# Patient Record
Sex: FEMALE | URBAN - NONMETROPOLITAN AREA
[De-identification: names, ages, dates, MRNs, and addresses within clinical notes are randomized per-mention and may not be internally consistent; named-entity substitution may affect disease eponyms.]

---

## 2018-10-31 ENCOUNTER — APPOINTMENT (RX ONLY)
Dept: URBAN - NONMETROPOLITAN AREA CLINIC 12 | Facility: CLINIC | Age: 73
Setting detail: DERMATOLOGY
End: 2018-10-31

## 2018-10-31 DIAGNOSIS — L82.1 OTHER SEBORRHEIC KERATOSIS: ICD-10-CM

## 2018-10-31 DIAGNOSIS — L57.0 ACTINIC KERATOSIS: ICD-10-CM

## 2018-10-31 DIAGNOSIS — Z71.89 OTHER SPECIFIED COUNSELING: ICD-10-CM

## 2018-10-31 DIAGNOSIS — L57.8 OTHER SKIN CHANGES DUE TO CHRONIC EXPOSURE TO NONIONIZING RADIATION: ICD-10-CM

## 2018-10-31 DIAGNOSIS — D22 MELANOCYTIC NEVI: ICD-10-CM

## 2018-10-31 DIAGNOSIS — L82.0 INFLAMED SEBORRHEIC KERATOSIS: ICD-10-CM

## 2018-10-31 DIAGNOSIS — L81.4 OTHER MELANIN HYPERPIGMENTATION: ICD-10-CM

## 2018-10-31 DIAGNOSIS — D18.0 HEMANGIOMA: ICD-10-CM

## 2018-10-31 PROBLEM — I10 ESSENTIAL (PRIMARY) HYPERTENSION: Status: ACTIVE | Noted: 2018-10-31

## 2018-10-31 PROBLEM — D22.5 MELANOCYTIC NEVI OF TRUNK: Status: ACTIVE | Noted: 2018-10-31

## 2018-10-31 PROBLEM — D18.01 HEMANGIOMA OF SKIN AND SUBCUTANEOUS TISSUE: Status: ACTIVE | Noted: 2018-10-31

## 2018-10-31 PROCEDURE — ? COUNSELING

## 2018-10-31 PROCEDURE — 17003 DESTRUCT PREMALG LES 2-14: CPT

## 2018-10-31 PROCEDURE — ? MEDICATION COUNSELING

## 2018-10-31 PROCEDURE — ? LIQUID NITROGEN

## 2018-10-31 PROCEDURE — ? PRESCRIPTION

## 2018-10-31 PROCEDURE — 17000 DESTRUCT PREMALG LESION: CPT | Mod: 59

## 2018-10-31 PROCEDURE — 99202 OFFICE O/P NEW SF 15 MIN: CPT | Mod: 25

## 2018-10-31 PROCEDURE — 17110 DESTRUCTION B9 LES UP TO 14: CPT

## 2018-10-31 RX ORDER — HYDROQUINONE 40 MG/G
CREAM TOPICAL
Qty: 1 | Refills: 1 | Status: ERX | COMMUNITY
Start: 2018-10-31

## 2018-10-31 RX ADMIN — HYDROQUINONE: 40 CREAM TOPICAL at 18:05

## 2018-10-31 ASSESSMENT — LOCATION DETAILED DESCRIPTION DERM
LOCATION DETAILED: RIGHT DORSAL WRIST
LOCATION DETAILED: RIGHT LATERAL TRAPEZIAL NECK
LOCATION DETAILED: LEFT PROXIMAL DORSAL FOREARM
LOCATION DETAILED: LEFT DISTAL DORSAL FOREARM
LOCATION DETAILED: LEFT LATERAL FOREHEAD
LOCATION DETAILED: UPPER STERNUM
LOCATION DETAILED: LEFT CENTRAL FRONTAL SCALP
LOCATION DETAILED: RIGHT PROXIMAL DORSAL FOREARM
LOCATION DETAILED: LEFT LATERAL TRAPEZIAL NECK
LOCATION DETAILED: LEFT CENTRAL TEMPLE
LOCATION DETAILED: LEFT NASAL SIDEWALL
LOCATION DETAILED: RIGHT LATERAL FOREHEAD
LOCATION DETAILED: RIGHT MEDIAL FOREHEAD
LOCATION DETAILED: RIGHT INFERIOR FOREHEAD

## 2018-10-31 ASSESSMENT — LOCATION SIMPLE DESCRIPTION DERM
LOCATION SIMPLE: LEFT FOREHEAD
LOCATION SIMPLE: RIGHT FOREARM
LOCATION SIMPLE: POSTERIOR NECK
LOCATION SIMPLE: LEFT NOSE
LOCATION SIMPLE: SCALP
LOCATION SIMPLE: RIGHT FOREHEAD
LOCATION SIMPLE: LEFT TEMPLE
LOCATION SIMPLE: CHEST
LOCATION SIMPLE: LEFT FOREARM
LOCATION SIMPLE: RIGHT WRIST

## 2018-10-31 ASSESSMENT — LOCATION ZONE DERM
LOCATION ZONE: ARM
LOCATION ZONE: NOSE
LOCATION ZONE: TRUNK
LOCATION ZONE: NECK
LOCATION ZONE: FACE
LOCATION ZONE: SCALP

## 2018-10-31 NOTE — PROCEDURE: LIQUID NITROGEN
Consent: The patient's consent was obtained including but not limited to risks of crusting, scabbing, blistering, scarring, darker or lighter pigmentary change, recurrence, incomplete removal and infection.
Number Of Freeze-Thaw Cycles: 2 freeze-thaw cycles
Post-Care Instructions: I reviewed with the patient in detail post-care instructions. Patient is to wear sunprotection, and avoid picking at any of the treated lesions. Pt may apply Vaseline to crusted or scabbing areas.
Detail Level: Detailed
Duration Of Freeze Thaw-Cycle (Seconds): 1
Render Post-Care Instructions In Note?: yes
Add 52 Modifier (Optional): no
Medical Necessity Clause: This procedure was medically necessary because the lesions that were treated were:
Number Of Freeze-Thaw Cycles: 3 freeze-thaw cycles
Medical Necessity Information: It is in your best interest to select a reason for this procedure from the list below. All of these items fulfill various CMS LCD requirements except the new and changing color options.
Duration Of Freeze Thaw-Cycle (Seconds): 3

## 2018-10-31 NOTE — PROCEDURE: MEDICATION COUNSELING
Carac Pregnancy And Lactation Text: This medication is Pregnancy Category X and contraindicated in pregnancy and in women who may become pregnant. It is unknown if this medication is excreted in breast milk.
Minoxidil Pregnancy And Lactation Text: This medication has not been assigned a Pregnancy Risk Category but animal studies failed to show danger with the topical medication. It is unknown if the medication is excreted in breast milk.
Humira Pregnancy And Lactation Text: This medication is Pregnancy Category B and is considered safe during pregnancy. It is unknown if this medication is excreted in breast milk.
Albendazole Counseling:  I discussed with the patient the risks of albendazole including but not limited to cytopenia, kidney damage, nausea/vomiting and severe allergy.  The patient understands that this medication is being used in an off-label manner.
Colchicine Counseling:  Patient counseled regarding adverse effects including but not limited to stomach upset (nausea, vomiting, stomach pain, or diarrhea).  Patient instructed to limit alcohol consumption while taking this medication.  Colchicine may reduce blood counts especially with prolonged use.  The patient understands that monitoring of kidney function and blood counts may be required, especially at baseline. The patient verbalized understanding of the proper use and possible adverse effects of colchicine.  All of the patient's questions and concerns were addressed.
Cyclophosphamide Pregnancy And Lactation Text: This medication is Pregnancy Category D and it isn't considered safe during pregnancy. This medication is excreted in breast milk.
Methotrexate Counseling:  Patient counseled regarding adverse effects of methotrexate including but not limited to nausea, vomiting, abnormalities in liver function tests. Patients may develop mouth sores, rash, diarrhea, and abnormalities in blood counts. The patient understands that monitoring is required including LFT's and blood counts.  There is a rare possibility of scarring of the liver and lung problems that can occur when taking methotrexate. Persistent nausea, loss of appetite, pale stools, dark urine, cough, and shortness of breath should be reported immediately. Patient advised to discontinue methotrexate treatment at least three months before attempting to become pregnant.  I discussed the need for folate supplements while taking methotrexate.  These supplements can decrease side effects during methotrexate treatment. The patient verbalized understanding of the proper use and possible adverse effects of methotrexate.  All of the patient's questions and concerns were addressed.
Tazorac Counseling:  Patient advised that medication is irritating and drying.  Patient may need to apply sparingly and wash off after an hour before eventually leaving it on overnight.  The patient verbalized understanding of the proper use and possible adverse effects of tazorac.  All of the patient's questions and concerns were addressed.
Albendazole Pregnancy And Lactation Text: This medication is Pregnancy Category C and it isn't known if it is safe during pregnancy. It is also excreted in breast milk.
Spironolactone Pregnancy And Lactation Text: This medication can cause feminization of the male fetus and should be avoided during pregnancy. The active metabolite is also found in breast milk.
Minoxidil Counseling: Minoxidil is a topical medication which can increase blood flow where it is applied. It is uncertain how this medication increases hair growth. Side effects are uncommon and include stinging and allergic reactions.
Xelvanessaz Pregnancy And Lactation Text: This medication is Pregnancy Category D and is not considered safe during pregnancy.  The risk during breast feeding is also uncertain.
Imiquimod Counseling:  I discussed with the patient the risks of imiquimod including but not limited to erythema, scaling, itching, weeping, crusting, and pain.  Patient understands that the inflammatory response to imiquimod is variable from person to person and was educated regarded proper titration schedule.  If flu-like symptoms develop, patient knows to discontinue the medication and contact us.
Benzoyl Peroxide Pregnancy And Lactation Text: This medication is Pregnancy Category C. It is unknown if benzoyl peroxide is excreted in breast milk.
Arava Pregnancy And Lactation Text: This medication is Pregnancy Category X and is absolutely contraindicated during pregnancy. It is unknown if it is excreted in breast milk.
Fluconazole Pregnancy And Lactation Text: This medication is Pregnancy Category C and it isn't know if it is safe during pregnancy. It is also excreted in breast milk.
Elidel Counseling: Patient may experience a mild burning sensation during topical application. Elidel is not approved in children less than 2 years of age. There have been case reports of hematologic and skin malignancies in patients using topical calcineurin inhibitors although causality is questionable.
Griseofulvin Counseling:  I discussed with the patient the risks of griseofulvin including but not limited to photosensitivity, cytopenia, liver damage, nausea/vomiting and severe allergy.  The patient understands that this medication is best absorbed when taken with a fatty meal (e.g., ice cream or french fries).
Metronidazole Pregnancy And Lactation Text: This medication is Pregnancy Category B and considered safe during pregnancy.  It is also excreted in breast milk.
Otezla Counseling: The side effects of Otezla were discussed with the patient, including but not limited to worsening or new depression, weight loss, diarrhea, nausea, upper respiratory tract infection, and headache. Patient instructed to call the office should any adverse effect occur.  The patient verbalized understanding of the proper use and possible adverse effects of Otezla.  All the patient's questions and concerns were addressed.
Xeljanz Counseling: I discussed with the patient the risks of Xeljanz therapy including increased risk of infection, liver issues, headache, diarrhea, or cold symptoms. Live vaccines should be avoided. They were instructed to call if they have any problems.
Glycopyrrolate Pregnancy And Lactation Text: This medication is Pregnancy Category B and is considered safe during pregnancy. It is unknown if it is excreted breast milk.
Cephalexin Counseling: I counseled the patient regarding use of cephalexin as an antibiotic for prophylactic and/or therapeutic purposes. Cephalexin (commonly prescribed under brand name Keflex) is a cephalosporin antibiotic which is active against numerous classes of bacteria, including most skin bacteria. Side effects may include nausea, diarrhea, gastrointestinal upset, rash, hives, yeast infections, and in rare cases, hepatitis, kidney disease, seizures, fever, confusion, neurologic symptoms, and others. Patients with severe allergies to penicillin medications are cautioned that there is about a 10% incidence of cross-reactivity with cephalosporins. When possible, patients with penicillin allergies should use alternatives to cephalosporins for antibiotic therapy.
Ketoconazole Counseling:   Patient counseled regarding improving absorption with orange juice.  Adverse effects include but are not limited to breast enlargement, headache, diarrhea, nausea, upset stomach, liver function test abnormalities, taste disturbance, and stomach pain.  There is a rare possibility of liver failure that can occur when taking ketoconazole. The patient understands that monitoring of LFTs may be required, especially at baseline. The patient verbalized understanding of the proper use and possible adverse effects of ketoconazole.  All of the patient's questions and concerns were addressed.
Cosentyx Counseling:  I discussed with the patient the risks of Cosentyx including but not limited to worsening of Crohn's disease, immunosuppression, allergic reactions and infections.  The patient understands that monitoring is required including a PPD at baseline and must alert us or the primary physician if symptoms of infection or other concerning signs are noted.
Doxepin Pregnancy And Lactation Text: This medication is Pregnancy Category C and it isn't known if it is safe during pregnancy. It is also excreted in breast milk and breast feeding isn't recommended.
Sski Pregnancy And Lactation Text: This medication is Pregnancy Category D and isn't considered safe during pregnancy. It is excreted in breast milk.
Acitretin Pregnancy And Lactation Text: This medication is Pregnancy Category X and should not be given to women who are pregnant or may become pregnant in the future. This medication is excreted in breast milk.
Rituxan Pregnancy And Lactation Text: This medication is Pregnancy Category C and it isn't know if it is safe during pregnancy. It is unknown if this medication is excreted in breast milk but similar antibodies are known to be excreted.
Oxybutynin Counseling:  I discussed with the patient the risks of oxybutynin including but not limited to skin rash, drowsiness, dry mouth, difficulty urinating, and blurred vision.
Tetracycline Counseling: Patient counseled regarding possible photosensitivity and increased risk for sunburn.  Patient instructed to avoid sunlight, if possible.  When exposed to sunlight, patients should wear protective clothing, sunglasses, and sunscreen.  The patient was instructed to call the office immediately if the following severe adverse effects occur:  hearing changes, easy bruising/bleeding, severe headache, or vision changes.  The patient verbalized understanding of the proper use and possible adverse effects of tetracycline.  All of the patient's questions and concerns were addressed. Patient understands to avoid pregnancy while on therapy due to potential birth defects.
Azithromycin Counseling:  I discussed with the patient the risks of azithromycin including but not limited to GI upset, allergic reaction, drug rash, diarrhea, and yeast infections.
Odomzo Counseling- I discussed with the patient the risks of Odomzo including but not limited to nausea, vomiting, diarrhea, constipation, weight loss, changes in the sense of taste, decreased appetite, muscle spasms, and hair loss.  The patient verbalized understanding of the proper use and possible adverse effects of Odomzo.  All of the patient's questions and concerns were addressed.
Nsaids Counseling: NSAID Counseling: I discussed with the patient that NSAIDs should be taken with food. Prolonged use of NSAIDs can result in the development of stomach ulcers.  Patient advised to stop taking NSAIDs if abdominal pain occurs.  The patient verbalized understanding of the proper use and possible adverse effects of NSAIDs.  All of the patient's questions and concerns were addressed.
Clindamycin Pregnancy And Lactation Text: This medication can be used in pregnancy if certain situations. Clindamycin is also present in breast milk.
Erythromycin Pregnancy And Lactation Text: This medication is Pregnancy Category B and is considered safe during pregnancy. It is also excreted in breast milk.
Cimzia Counseling:  I discussed with the patient the risks of Cimzia including but not limited to immunosuppression, allergic reactions and infections.  The patient understands that monitoring is required including a PPD at baseline and must alert us or the primary physician if symptoms of infection or other concerning signs are noted.
Taltz Counseling: I discussed with the patient the risks of ixekizumab including but not limited to immunosuppression, serious infections, worsening of inflammatory bowel disease and drug reactions.  The patient understands that monitoring is required including a PPD at baseline and must alert us or the primary physician if symptoms of infection or other concerning signs are noted.
Isotretinoin Counseling: Patient should get monthly blood tests, not donate blood, not drive at night if vision affected, not share medication, and not undergo elective surgery for 6 months after tx completed. Side effects reviewed, pt to contact office should one occur.
Elidel Pregnancy And Lactation Text: This medication is Pregnancy Category C. It is unknown if this medication is excreted in breast milk.
5-Fu Counseling: 5-Fluorouracil Counseling:  I discussed with the patient the risks of 5-fluorouracil including but not limited to erythema, scaling, itching, weeping, crusting, and pain.
Drysol Pregnancy And Lactation Text: This medication is considered safe during pregnancy and breast feeding.
Birth Control Pills Pregnancy And Lactation Text: This medication should be avoided if pregnant and for the first 30 days post-partum.
Methotrexate Pregnancy And Lactation Text: This medication is Pregnancy Category X and is known to cause fetal harm. This medication is excreted in breast milk.
Use Enhanced Medication Counseling?: No
Siliq Pregnancy And Lactation Text: The risk during pregnancy and breastfeeding is uncertain with this medication.
Bactrim Counseling:  I discussed with the patient the risks of sulfa antibiotics including but not limited to GI upset, allergic reaction, drug rash, diarrhea, dizziness, photosensitivity, and yeast infections.  Rarely, more serious reactions can occur including but not limited to aplastic anemia, agranulocytosis, methemoglobinemia, blood dyscrasias, liver or kidney failure, lung infiltrates or desquamative/blistering drug rashes.
Valtrex Counseling: I discussed with the patient the risks of valacyclovir including but not limited to kidney damage, nausea, vomiting and severe allergy.  The patient understands that if the infection seems to be worsening or is not improving, they are to call.
Azathioprine Pregnancy And Lactation Text: This medication is Pregnancy Category D and isn't considered safe during pregnancy. It is unknown if this medication is excreted in breast milk.
Solaraze Counseling:  I discussed with the patient the risks of Solaraze including but not limited to erythema, scaling, itching, weeping, crusting, and pain.
Otezla Pregnancy And Lactation Text: This medication is Pregnancy Category C and it isn't known if it is safe during pregnancy. It is unknown if it is excreted in breast milk.
Bexarotene Pregnancy And Lactation Text: This medication is Pregnancy Category X and should not be given to women who are pregnant or may become pregnant. This medication should not be used if you are breast feeding.
Hydroxychloroquine Counseling:  I discussed with the patient that a baseline ophthalmologic exam is needed at the start of therapy and every year thereafter while on therapy. A CBC may also be warranted for monitoring.  The side effects of this medication were discussed with the patient, including but not limited to agranulocytosis, aplastic anemia, seizures, rashes, retinopathy, and liver toxicity. Patient instructed to call the office should any adverse effect occur.  The patient verbalized understanding of the proper use and possible adverse effects of Plaquenil.  All the patient's questions and concerns were addressed.
Dupixent Counseling: I discussed with the patient the risks of dupilumab including but not limited to eye infection and irritation, cold sores, injection site reactions, worsening of asthma, allergic reactions and increased risk of parasitic infection.  Live vaccines should be avoided while taking dupilumab. Dupilumab will also interact with certain medications such as warfarin and cyclosporine. The patient understands that monitoring is required and they must alert us or the primary physician if symptoms of infection or other concerning signs are noted.
Azathioprine Counseling:  I discussed with the patient the risks of azathioprine including but not limited to myelosuppression, immunosuppression, hepatotoxicity, lymphoma, and infections.  The patient understands that monitoring is required including baseline LFTs, Creatinine, possible TPMP genotyping and weekly CBCs for the first month and then every 2 weeks thereafter.  The patient verbalized understanding of the proper use and possible adverse effects of azathioprine.  All of the patient's questions and concerns were addressed.
Detail Level: Simple
Azithromycin Pregnancy And Lactation Text: This medication is considered safe during pregnancy and is also secreted in breast milk.
Dapsone Pregnancy And Lactation Text: This medication is Pregnancy Category C and is not considered safe during pregnancy or breast feeding.
Arava Counseling:  Patient counseled regarding adverse effects of Arava including but not limited to nausea, vomiting, abnormalities in liver function tests. Patients may develop mouth sores, rash, diarrhea, and abnormalities in blood counts. The patient understands that monitoring is required including LFTs and blood counts.  There is a rare possibility of scarring of the liver and lung problems that can occur when taking methotrexate. Persistent nausea, loss of appetite, pale stools, dark urine, cough, and shortness of breath should be reported immediately. Patient advised to discontinue Arava treatment and consult with a physician prior to attempting conception. The patient will have to undergo a treatment to eliminate Arava from the body prior to conception.
Erivedge Counseling- I discussed with the patient the risks of Erivedge including but not limited to nausea, vomiting, diarrhea, constipation, weight loss, changes in the sense of taste, decreased appetite, muscle spasms, and hair loss.  The patient verbalized understanding of the proper use and possible adverse effects of Erivedge.  All of the patient's questions and concerns were addressed.
Nsaids Pregnancy And Lactation Text: These medications are considered safe up to 30 weeks gestation. It is excreted in breast milk.
Cyclosporine Pregnancy And Lactation Text: This medication is Pregnancy Category C and it isn't know if it is safe during pregnancy. This medication is excreted in breast milk.
Quinolones Counseling:  I discussed with the patient the risks of fluoroquinolones including but not limited to GI upset, allergic reaction, drug rash, diarrhea, dizziness, photosensitivity, yeast infections, liver function test abnormalities, tendonitis/tendon rupture.
Enbrel Counseling:  I discussed with the patient the risks of etanercept including but not limited to myelosuppression, immunosuppression, autoimmune hepatitis, demyelinating diseases, lymphoma, and infections.  The patient understands that monitoring is required including a PPD at baseline and must alert us or the primary physician if symptoms of infection or other concerning signs are noted.
High Dose Vitamin A Pregnancy And Lactation Text: High dose vitamin A therapy is contraindicated during pregnancy and breast feeding.
Hydroxyzine Counseling: Patient advised that the medication is sedating and not to drive a car after taking this medication.  Patient informed of potential adverse effects including but not limited to dry mouth, urinary retention, and blurry vision.  The patient verbalized understanding of the proper use and possible adverse effects of hydroxyzine.  All of the patient's questions and concerns were addressed.
Topical Sulfur Applications Counseling: Topical Sulfur Counseling: Patient counseled that this medication may cause skin irritation or allergic reactions.  In the event of skin irritation, the patient was advised to reduce the amount of the drug applied or use it less frequently.   The patient verbalized understanding of the proper use and possible adverse effects of topical sulfur application.  All of the patient's questions and concerns were addressed.
Cyclophosphamide Counseling:  I discussed with the patient the risks of cyclophosphamide including but not limited to hair loss, hormonal abnormalities, decreased fertility, abdominal pain, diarrhea, nausea and vomiting, bone marrow suppression and infection. The patient understands that monitoring is required while taking this medication.
Isotretinoin Pregnancy And Lactation Text: This medication is Pregnancy Category X and is considered extremely dangerous during pregnancy. It is unknown if it is excreted in breast milk.
Oxybutynin Pregnancy And Lactation Text: This medication is Pregnancy Category B and is considered safe during pregnancy. It is unknown if it is excreted in breast milk.
Spironolactone Counseling: Patient advised regarding risks of diarrhea, abdominal pain, hyperkalemia, birth defects (for female patients), liver toxicity and renal toxicity. The patient may need blood work to monitor liver and kidney function and potassium levels while on therapy. The patient verbalized understanding of the proper use and possible adverse effects of spironolactone.  All of the patient's questions and concerns were addressed.
Terbinafine Pregnancy And Lactation Text: This medication is Pregnancy Category B and is considered safe during pregnancy. It is also excreted in breast milk and breast feeding isn't recommended.
Eucrisa Counseling: Patient may experience a mild burning sensation during topical application. Eucrisa is not approved in children less than 2 years of age.
Fluconazole Counseling:  Patient counseled regarding adverse effects of fluconazole including but not limited to headache, diarrhea, nausea, upset stomach, liver function test abnormalities, taste disturbance, and stomach pain.  There is a rare possibility of liver failure that can occur when taking fluconazole.  The patient understands that monitoring of LFTs and kidney function test may be required, especially at baseline. The patient verbalized understanding of the proper use and possible adverse effects of fluconazole.  All of the patient's questions and concerns were addressed.
Hydroxychloroquine Pregnancy And Lactation Text: This medication has been shown to cause fetal harm but it isn't assigned a Pregnancy Risk Category. There are small amounts excreted in breast milk.
Clofazimine Counseling:  I discussed with the patient the risks of clofazimine including but not limited to skin and eye pigmentation, liver damage, nausea/vomiting, gastrointestinal bleeding and allergy.
Minocycline Counseling: Patient advised regarding possible photosensitivity and discoloration of the teeth, skin, lips, tongue and gums.  Patient instructed to avoid sunlight, if possible.  When exposed to sunlight, patients should wear protective clothing, sunglasses, and sunscreen.  The patient was instructed to call the office immediately if the following severe adverse effects occur:  hearing changes, easy bruising/bleeding, severe headache, or vision changes.  The patient verbalized understanding of the proper use and possible adverse effects of minocycline.  All of the patient's questions and concerns were addressed.
Itraconazole Counseling:  I discussed with the patient the risks of itraconazole including but not limited to liver damage, nausea/vomiting, neuropathy, and severe allergy.  The patient understands that this medication is best absorbed when taken with acidic beverages such as non-diet cola or ginger ale.  The patient understands that monitoring is required including baseline LFTs and repeat LFTs at intervals.  The patient understands that they are to contact us or the primary physician if concerning signs are noted.
Humira Counseling:  I discussed with the patient the risks of adalimumab including but not limited to myelosuppression, immunosuppression, autoimmune hepatitis, demyelinating diseases, lymphoma, and serious infections.  The patient understands that monitoring is required including a PPD at baseline and must alert us or the primary physician if symptoms of infection or other concerning signs are noted.
Benzoyl Peroxide Counseling: Patient counseled that medicine may cause skin irritation and bleach clothing.  In the event of skin irritation, the patient was advised to reduce the amount of the drug applied or use it less frequently.   The patient verbalized understanding of the proper use and possible adverse effects of benzoyl peroxide.  All of the patient's questions and concerns were addressed.
Cyclosporine Counseling:  I discussed with the patient the risks of cyclosporine including but not limited to hypertension, gingival hyperplasia,myelosuppression, immunosuppression, liver damage, kidney damage, neurotoxicity, lymphoma, and serious infections. The patient understands that monitoring is required including baseline blood pressure, CBC, CMP, lipid panel and uric acid, and then 1-2 times monthly CMP and blood pressure.
Metronidazole Counseling:  I discussed with the patient the risks of metronidazole including but not limited to seizures, nausea/vomiting, a metallic taste in the mouth, nausea/vomiting and severe allergy.
Cephalexin Pregnancy And Lactation Text: This medication is Pregnancy Category B and considered safe during pregnancy.  It is also excreted in breast milk but can be used safely for shorter doses.
Prednisone Counseling:  I discussed with the patient the risks of prolonged use of prednisone including but not limited to weight gain, insomnia, osteoporosis, mood changes, diabetes, susceptibility to infection, glaucoma and high blood pressure.  In cases where prednisone use is prolonged, patients should be monitored with blood pressure checks, serum glucose levels and an eye exam.  Additionally, the patient may need to be placed on GI prophylaxis, PCP prophylaxis, and calcium and vitamin D supplementation and/or a bisphosphonate.  The patient verbalized understanding of the proper use and the possible adverse effects of prednisone.  All of the patient's questions and concerns were addressed.
SSKI Counseling:  I discussed with the patient the risks of SSKI including but not limited to thyroid abnormalities, metallic taste, GI upset, fever, headache, acne, arthralgias, paraesthesias, lymphadenopathy, easy bleeding, arrhythmias, and allergic reaction.
Ketoconazole Pregnancy And Lactation Text: This medication is Pregnancy Category C and it isn't know if it is safe during pregnancy. It is also excreted in breast milk and breast feeding isn't recommended.
Erythromycin Counseling:  I discussed with the patient the risks of erythromycin including but not limited to GI upset, allergic reaction, drug rash, diarrhea, increase in liver enzymes, and yeast infections.
Acitretin Counseling:  I discussed with the patient the risks of acitretin including but not limited to hair loss, dry lips/skin/eyes, liver damage, hyperlipidemia, depression/suicidal ideation, photosensitivity.  Serious rare side effects can include but are not limited to pancreatitis, pseudotumor cerebri, bony changes, clot formation/stroke/heart attack.  Patient understands that alcohol is contraindicated since it can result in liver toxicity and significantly prolong the elimination of the drug by many years.
Gabapentin Pregnancy And Lactation Text: This medication is Pregnancy Category C and isn't considered safe during pregnancy. It is excreted in breast milk.
Glycopyrrolate Counseling:  I discussed with the patient the risks of glycopyrrolate including but not limited to skin rash, drowsiness, dry mouth, difficulty urinating, and blurred vision.
Birth Control Pills Counseling: Birth Control Pill Counseling: I discussed with the patient the potential side effects of OCPs including but not limited to increased risk of stroke, heart attack, thrombophlebitis, deep venous thrombosis, hepatic adenomas, breast changes, GI upset, headaches, and depression.  The patient verbalized understanding of the proper use and possible adverse effects of OCPs. All of the patient's questions and concerns were addressed.
Xolair Counseling:  Patient informed of potential adverse effects including but not limited to fever, muscle aches, rash and allergic reactions.  The patient verbalized understanding of the proper use and possible adverse effects of Xolair.  All of the patient's questions and concerns were addressed.
Infliximab Counseling:  I discussed with the patient the risks of infliximab including but not limited to myelosuppression, immunosuppression, autoimmune hepatitis, demyelinating diseases, lymphoma, and serious infections.  The patient understands that monitoring is required including a PPD at baseline and must alert us or the primary physician if symptoms of infection or other concerning signs are noted.
Tetracycline Pregnancy And Lactation Text: This medication is Pregnancy Category D and not consider safe during pregnancy. It is also excreted in breast milk.
Thalidomide Counseling: I discussed with the patient the risks of thalidomide including but not limited to birth defects, anxiety, weakness, chest pain, dizziness, cough and severe allergy.
Topical Sulfur Applications Pregnancy And Lactation Text: This medication is Pregnancy Category C and has an unknown safety profile during pregnancy. It is unknown if this topical medication is excreted in breast milk.
Solaraze Pregnancy And Lactation Text: This medication is Pregnancy Category B and is considered safe. There is some data to suggest avoiding during the third trimester. It is unknown if this medication is excreted in breast milk.
Topical Clindamycin Counseling: Patient counseled that this medication may cause skin irritation or allergic reactions.  In the event of skin irritation, the patient was advised to reduce the amount of the drug applied or use it less frequently.   The patient verbalized understanding of the proper use and possible adverse effects of clindamycin.  All of the patient's questions and concerns were addressed.
Carac Counseling:  I discussed with the patient the risks of Carac including but not limited to erythema, scaling, itching, weeping, crusting, and pain.
Ivermectin Counseling:  Patient instructed to take medication on an empty stomach with a full glass of water.  Patient informed of potential adverse effects including but not limited to nausea, diarrhea, dizziness, itching, and swelling of the extremities or lymph nodes.  The patient verbalized understanding of the proper use and possible adverse effects of ivermectin.  All of the patient's questions and concerns were addressed.
Bexarotene Counseling:  I discussed with the patient the risks of bexarotene including but not limited to hair loss, dry lips/skin/eyes, liver abnormalities, hyperlipidemia, pancreatitis, depression/suicidal ideation, photosensitivity, drug rash/allergic reactions, hypothyroidism, anemia, leukopenia, infection, cataracts, and teratogenicity.  Patient understands that they will need regular blood tests to check lipid profile, liver function tests, white blood cell count, thyroid function tests and pregnancy test if applicable.
Rifampin Counseling: I discussed with the patient the risks of rifampin including but not limited to liver damage, kidney damage, red-orange body fluids, nausea/vomiting and severe allergy.
Valtrex Pregnancy And Lactation Text: this medication is Pregnancy Category B and is considered safe during pregnancy. This medication is not directly found in breast milk but it's metabolite acyclovir is present.
Rituxan Counseling:  I discussed with the patient the risks of Rituxan infusions. Side effects can include infusion reactions, severe drug rashes including mucocutaneous reactions, reactivation of latent hepatitis and other infections and rarely progressive multifocal leukoencephalopathy.  All of the patient's questions and concerns were addressed.
Drysol Counseling:  I discussed with the patient the risks of drysol/aluminum chloride including but not limited to skin rash, itching, irritation, burning.
Picato Counseling:  I discussed with the patient the risks of Picato including but not limited to erythema, scaling, itching, weeping, crusting, and pain.
Rifampin Pregnancy And Lactation Text: This medication is Pregnancy Category C and it isn't know if it is safe during pregnancy. It is also excreted in breast milk and should not be used if you are breast feeding.
Cimzia Pregnancy And Lactation Text: This medication crosses the placenta but can be considered safe in certain situations. Cimzia may be excreted in breast milk.
Clindamycin Counseling: I counseled the patient regarding use of clindamycin as an antibiotic for prophylactic and/or therapeutic purposes. Clindamycin is active against numerous classes of bacteria, including skin bacteria. Side effects may include nausea, diarrhea, gastrointestinal upset, rash, hives, yeast infections, and in rare cases, colitis.
Cimetidine Counseling:  I discussed with the patient the risks of Cimetidine including but not limited to gynecomastia, headache, diarrhea, nausea, drowsiness, arrhythmias, pancreatitis, skin rashes, psychosis, bone marrow suppression and kidney toxicity.
Simponi Counseling:  I discussed with the patient the risks of golimumab including but not limited to myelosuppression, immunosuppression, autoimmune hepatitis, demyelinating diseases, lymphoma, and serious infections.  The patient understands that monitoring is required including a PPD at baseline and must alert us or the primary physician if symptoms of infection or other concerning signs are noted.
Stelara Counseling:  I discussed with the patient the risks of ustekinumab including but not limited to immunosuppression, malignancy, posterior leukoencephalopathy syndrome, and serious infections.  The patient understands that monitoring is required including a PPD at baseline and must alert us or the primary physician if symptoms of infection or other concerning signs are noted.
Doxepin Counseling:  Patient advised that the medication is sedating and not to drive a car after taking this medication. Patient informed of potential adverse effects including but not limited to dry mouth, urinary retention, and blurry vision.  The patient verbalized understanding of the proper use and possible adverse effects of doxepin.  All of the patient's questions and concerns were addressed.
Griseofulvin Pregnancy And Lactation Text: This medication is Pregnancy Category X and is known to cause serious birth defects. It is unknown if this medication is excreted in breast milk but breast feeding should be avoided.
Hydroquinone Counseling:  Patient advised that medication may result in skin irritation, lightening (hypopigmentation), dryness, and burning.  In the event of skin irritation, the patient was advised to reduce the amount of the drug applied or use it less frequently.  Rarely, spots that are treated with hydroquinone can become darker (pseudoochronosis).  Should this occur, patient instructed to stop medication and call the office. The patient verbalized understanding of the proper use and possible adverse effects of hydroquinone.  All of the patient's questions and concerns were addressed.
Doxycycline Pregnancy And Lactation Text: This medication is Pregnancy Category D and not consider safe during pregnancy. It is also excreted in breast milk but is considered safe for shorter treatment courses.
Dupixent Pregnancy And Lactation Text: This medication likely crosses the placenta but the risk for the fetus is uncertain. This medication is excreted in breast milk.
Xolair Pregnancy And Lactation Text: This medication is Pregnancy Category B and is considered safe during pregnancy. This medication is excreted in breast milk.
Tazorac Pregnancy And Lactation Text: This medication is not safe during pregnancy. It is unknown if this medication is excreted in breast milk.
Bactrim Pregnancy And Lactation Text: This medication is Pregnancy Category D and is known to cause fetal risk.  It is also excreted in breast milk.
Siliq Counseling:  I discussed with the patient the risks of Siliq including but not limited to new or worsening depression, suicidal thoughts and behavior, immunosuppression, malignancy, posterior leukoencephalopathy syndrome, and serious infections.  The patient understands that monitoring is required including a PPD at baseline and must alert us or the primary physician if symptoms of infection or other concerning signs are noted. There is also a special program designed to monitor depression which is required with Siliq.
Cellcept Counseling:  I discussed with the patient the risks of mycophenolate mofetil including but not limited to infection/immunosuppression, GI upset, hypokalemia, hypercholesterolemia, bone marrow suppression, lymphoproliferative disorders, malignancy, GI ulceration/bleed/perforation, colitis, interstitial lung disease, kidney failure, progressive multifocal leukoencephalopathy, and birth defects.  The patient understands that monitoring is required including a baseline creatinine and regular CBC testing. In addition, patient must alert us immediately if symptoms of infection or other concerning signs are noted.
Gabapentin Counseling: I discussed with the patient the risks of gabapentin including but not limited to dizziness, somnolence, fatigue and ataxia.
Zyclara Counseling:  I discussed with the patient the risks of imiquimod including but not limited to erythema, scaling, itching, weeping, crusting, and pain.  Patient understands that the inflammatory response to imiquimod is variable from person to person and was educated regarded proper titration schedule.  If flu-like symptoms develop, patient knows to discontinue the medication and contact us.
Terbinafine Counseling: Patient counseling regarding adverse effects of terbinafine including but not limited to headache, diarrhea, rash, upset stomach, liver function test abnormalities, itching, taste/smell disturbance, nausea, abdominal pain, and flatulence.  There is a rare possibility of liver failure that can occur when taking terbinafine.  The patient understands that a baseline LFT and kidney function test may be required. The patient verbalized understanding of the proper use and possible adverse effects of terbinafine.  All of the patient's questions and concerns were addressed.
Topical Retinoid counseling:  Patient advised to apply a pea-sized amount only at bedtime and wait 30 minutes after washing their face before applying.  If too drying, patient may add a non-comedogenic moisturizer. The patient verbalized understanding of the proper use and possible adverse effects of retinoids.  All of the patient's questions and concerns were addressed.
Hydroxyzine Pregnancy And Lactation Text: This medication is not safe during pregnancy and should not be taken. It is also excreted in breast milk and breast feeding isn't recommended.
Tremfya Counseling: I discussed with the patient the risks of guselkumab including but not limited to immunosuppression, serious infections, worsening of inflammatory bowel disease and drug reactions.  The patient understands that monitoring is required including a PPD at baseline and must alert us or the primary physician if symptoms of infection or other concerning signs are noted.
Dapsone Counseling: I discussed with the patient the risks of dapsone including but not limited to hemolytic anemia, agranulocytosis, rashes, methemoglobinemia, kidney failure, peripheral neuropathy, headaches, GI upset, and liver toxicity.  Patients who start dapsone require monitoring including baseline LFTs and weekly CBCs for the first month, then every month thereafter.  The patient verbalized understanding of the proper use and possible adverse effects of dapsone.  All of the patient's questions and concerns were addressed.
High Dose Vitamin A Counseling: Side effects reviewed, pt to contact office should one occur.
Protopic Pregnancy And Lactation Text: This medication is Pregnancy Category C. It is unknown if this medication is excreted in breast milk when applied topically.
Doxycycline Counseling:  Patient counseled regarding possible photosensitivity and increased risk for sunburn.  Patient instructed to avoid sunlight, if possible.  When exposed to sunlight, patients should wear protective clothing, sunglasses, and sunscreen.  The patient was instructed to call the office immediately if the following severe adverse effects occur:  hearing changes, easy bruising/bleeding, severe headache, or vision changes.  The patient verbalized understanding of the proper use and possible adverse effects of doxycycline.  All of the patient's questions and concerns were addressed.
Protopic Counseling: Patient may experience a mild burning sensation during topical application. Protopic is not approved in children less than 2 years of age. There have been case reports of hematologic and skin malignancies in patients using topical calcineurin inhibitors although causality is questionable.

## 2024-08-21 ENCOUNTER — APPOINTMENT (RX ONLY)
Dept: URBAN - NONMETROPOLITAN AREA CLINIC 12 | Facility: CLINIC | Age: 79
Setting detail: DERMATOLOGY
End: 2024-08-21

## 2024-08-21 DIAGNOSIS — L82.0 INFLAMED SEBORRHEIC KERATOSIS: ICD-10-CM

## 2024-08-21 DIAGNOSIS — L81.4 OTHER MELANIN HYPERPIGMENTATION: ICD-10-CM

## 2024-08-21 DIAGNOSIS — L82.1 OTHER SEBORRHEIC KERATOSIS: ICD-10-CM

## 2024-08-21 DIAGNOSIS — Z71.89 OTHER SPECIFIED COUNSELING: ICD-10-CM

## 2024-08-21 PROCEDURE — 17110 DESTRUCTION B9 LES UP TO 14: CPT

## 2024-08-21 PROCEDURE — ? LIQUID NITROGEN

## 2024-08-21 PROCEDURE — 99203 OFFICE O/P NEW LOW 30 MIN: CPT | Mod: 25

## 2024-08-21 PROCEDURE — ? COUNSELING

## 2024-08-21 ASSESSMENT — LOCATION DETAILED DESCRIPTION DERM
LOCATION DETAILED: RIGHT INFERIOR FOREHEAD
LOCATION DETAILED: RIGHT SUPERIOR HELIX
LOCATION DETAILED: RIGHT FOREHEAD
LOCATION DETAILED: RIGHT INFERIOR FRONTAL SCALP
LOCATION DETAILED: LEFT EYEBROW
LOCATION DETAILED: LEFT INFERIOR FOREHEAD
LOCATION DETAILED: LEFT INFERIOR MEDIAL FOREHEAD

## 2024-08-21 ASSESSMENT — LOCATION ZONE DERM
LOCATION ZONE: SCALP
LOCATION ZONE: FACE
LOCATION ZONE: EAR

## 2024-08-21 ASSESSMENT — LOCATION SIMPLE DESCRIPTION DERM
LOCATION SIMPLE: RIGHT FOREHEAD
LOCATION SIMPLE: RIGHT EAR
LOCATION SIMPLE: LEFT FOREHEAD
LOCATION SIMPLE: SCALP
LOCATION SIMPLE: LEFT EYEBROW

## 2024-08-21 NOTE — PROCEDURE: LIQUID NITROGEN
Duration Of Freeze Thaw-Cycle (Seconds): 3
Show Aperture Variable?: Yes
Detail Level: Detailed
Number Of Freeze-Thaw Cycles: 4 freeze-thaw cycles
Include Z78.9 (Other Specified Conditions Influencing Health Status) As An Associated Diagnosis?: No
Spray Paint Text: The liquid nitrogen was applied to the skin utilizing a spray paint frosting technique.
Medical Necessity Clause: This procedure was medically necessary because the lesions that were treated were:
Consent: The patient's consent was obtained including but not limited to risks of crusting, scabbing, blistering, scarring, darker or lighter pigmentary change, recurrence, incomplete removal and infection.
Medical Necessity Information: It is in your best interest to select a reason for this procedure from the list below. All of these items fulfill various CMS LCD requirements except the new and changing color options.
Post-Care Instructions: I reviewed with the patient in detail post-care instructions. Patient is to wear sunprotection, and avoid picking at any of the treated lesions. Pt may apply Vaseline to crusted or scabbing areas.

## 2024-09-05 ENCOUNTER — HOSPITAL ENCOUNTER (OUTPATIENT)
Dept: RADIOLOGY | Facility: MEDICAL CENTER | Age: 79
End: 2024-09-05
Payer: MEDICARE

## 2024-09-06 ENCOUNTER — APPOINTMENT (OUTPATIENT)
Dept: RADIOLOGY | Facility: MEDICAL CENTER | Age: 79
DRG: 086 | End: 2024-09-06
Attending: EMERGENCY MEDICINE
Payer: MEDICARE

## 2024-09-06 ENCOUNTER — HOSPITAL ENCOUNTER (INPATIENT)
Facility: MEDICAL CENTER | Age: 79
LOS: 2 days | DRG: 086 | End: 2024-09-08
Attending: EMERGENCY MEDICINE | Admitting: SURGERY
Payer: MEDICARE

## 2024-09-06 ENCOUNTER — APPOINTMENT (OUTPATIENT)
Dept: CARDIOLOGY | Facility: MEDICAL CENTER | Age: 79
DRG: 086 | End: 2024-09-06
Attending: FAMILY MEDICINE
Payer: MEDICARE

## 2024-09-06 DIAGNOSIS — I49.40 CARDIAC ARRHYTHMIA DUE TO PREMATURE DEPOLARIZATION, UNSPECIFIED TYPE: ICD-10-CM

## 2024-09-06 DIAGNOSIS — I48.91 ATRIAL FIBRILLATION, UNSPECIFIED TYPE (HCC): ICD-10-CM
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
## 2024-09-06 DIAGNOSIS — S09.90XA CLOSED HEAD INJURY, INITIAL ENCOUNTER: ICD-10-CM

## 2024-09-06 DIAGNOSIS — S06.5XAA SDH (SUBDURAL HEMATOMA) (HCC): ICD-10-CM

## 2024-09-06 DIAGNOSIS — I60.9 SAH (SUBARACHNOID HEMORRHAGE) (HCC): ICD-10-CM

## 2024-09-06 PROBLEM — D72.829 LEUKOCYTOSIS: Status: ACTIVE | Noted: 2024-09-06

## 2024-09-06 PROBLEM — I10 HYPERTENSION: Status: ACTIVE | Noted: 2024-09-06

## 2024-09-06 PROBLEM — T14.90XA TRAUMA: Status: ACTIVE | Noted: 2024-09-06

## 2024-09-06 PROBLEM — Z01.89 ENCOUNTER FOR GERIATRIC ASSESSMENT: Status: ACTIVE | Noted: 2024-09-06

## 2024-09-06 PROBLEM — Z79.82 ASPIRIN LONG-TERM USE: Status: ACTIVE | Noted: 2024-09-06

## 2024-09-06 PROBLEM — M54.2 CHRONIC NECK PAIN: Status: ACTIVE | Noted: 2024-09-06

## 2024-09-06 PROBLEM — Z53.09 CONTRAINDICATION TO DEEP VEIN THROMBOSIS (DVT) PROPHYLAXIS: Status: ACTIVE | Noted: 2024-09-06

## 2024-09-06 PROBLEM — G89.29 CHRONIC NECK PAIN: Status: ACTIVE | Noted: 2024-09-06

## 2024-09-06 PROBLEM — E87.1 HYPONATREMIA: Status: ACTIVE | Noted: 2024-09-06

## 2024-09-06 PROBLEM — S06.300A TRAUMATIC INTRACRANIAL HEMORRHAGE WITHOUT LOSS OF CONSCIOUSNESS (HCC): Status: ACTIVE | Noted: 2024-09-06

## 2024-09-06 LAB
ABO + RH BLD: NORMAL
ABO GROUP BLD: NORMAL
ALBUMIN SERPL BCP-MCNC: 3.8 G/DL (ref 3.2–4.9)
ALBUMIN/GLOB SERPL: 1.3 G/DL
ALP SERPL-CCNC: 69 U/L (ref 30–99)
ALT SERPL-CCNC: 49 U/L (ref 2–50)
ANION GAP SERPL CALC-SCNC: 11 MMOL/L (ref 7–16)
APTT PPP: 32.1 SEC (ref 24.7–36)
AST SERPL-CCNC: 39 U/L (ref 12–45)
BILIRUB SERPL-MCNC: 0.5 MG/DL (ref 0.1–1.5)
BLD GP AB SCN SERPL QL: NORMAL
BUN SERPL-MCNC: 24 MG/DL (ref 8–22)
CALCIUM ALBUM COR SERPL-MCNC: 9.8 MG/DL (ref 8.5–10.5)
CALCIUM SERPL-MCNC: 9.6 MG/DL (ref 8.5–10.5)
CFT BLD TEG: 5.2 MIN (ref 4.6–9.1)
CFT P HPASE BLD TEG: 4.6 MIN (ref 4.3–8.3)
CHLORIDE SERPL-SCNC: 101 MMOL/L (ref 96–112)
CLOT ANGLE BLD TEG: 73.4 DEGREES (ref 63–78)
CLOT LYSIS 30M P MA LENFR BLD TEG: 1.7 % (ref 0–2.6)
CO2 SERPL-SCNC: 20 MMOL/L (ref 20–33)
CREAT SERPL-MCNC: 0.51 MG/DL (ref 0.5–1.4)
CT.EXTRINSIC BLD ROTEM: 1.3 MIN (ref 0.8–2.1)
EKG IMPRESSION: NORMAL
EKG IMPRESSION: NORMAL
ERYTHROCYTE [DISTWIDTH] IN BLOOD BY AUTOMATED COUNT: 43.8 FL (ref 35.9–50)
ETHANOL BLD-MCNC: <10.1 MG/DL
GFR SERPLBLD CREATININE-BSD FMLA CKD-EPI: 94 ML/MIN/1.73 M 2
GLOBULIN SER CALC-MCNC: 3 G/DL (ref 1.9–3.5)
GLUCOSE SERPL-MCNC: 130 MG/DL (ref 65–99)
HCG SERPL QL: NEGATIVE
HCT VFR BLD AUTO: 41.6 % (ref 37–47)
HGB BLD-MCNC: 14.2 G/DL (ref 12–16)
INR PPP: 0.96 (ref 0.87–1.13)
LV EJECT FRACT  99904: 50
LV EJECT FRACT MOD 2C 99903: 49.64
LV EJECT FRACT MOD 4C 99902: 43.81
LV EJECT FRACT MOD BP 99901: 46.68
MAGNESIUM SERPL-MCNC: 2.2 MG/DL (ref 1.5–2.5)
MCF BLD TEG: 56.1 MM (ref 52–69)
MCF.PLATELET INHIB BLD ROTEM: 17.7 MM (ref 15–32)
MCH RBC QN AUTO: 31.8 PG (ref 27–33)
MCHC RBC AUTO-ENTMCNC: 34.1 G/DL (ref 32.2–35.5)
MCV RBC AUTO: 93.1 FL (ref 81.4–97.8)
PA AA BLD-ACNC: 90.1 % (ref 0–11)
PA ADP BLD-ACNC: 64.2 % (ref 0–17)
PHOSPHATE SERPL-MCNC: 3.9 MG/DL (ref 2.5–4.5)
PLATELET # BLD AUTO: 201 K/UL (ref 164–446)
PMV BLD AUTO: 9.7 FL (ref 9–12.9)
POTASSIUM SERPL-SCNC: 4.7 MMOL/L (ref 3.6–5.5)
PROT SERPL-MCNC: 6.8 G/DL (ref 6–8.2)
PROTHROMBIN TIME: 12.9 SEC (ref 12–14.6)
RBC # BLD AUTO: 4.47 M/UL (ref 4.2–5.4)
RH BLD: NORMAL
SODIUM SERPL-SCNC: 132 MMOL/L (ref 135–145)
TEG ALGORITHM TGALG: ABNORMAL
TSH SERPL-ACNC: 2.1 UIU/ML (ref 0.35–5.5)
VIT B12 SERPL-MCNC: 1106 PG/ML (ref 211–911)
WBC # BLD AUTO: 14.2 K/UL (ref 4.8–10.8)

## 2024-09-06 PROCEDURE — 93306 TTE W/DOPPLER COMPLETE: CPT

## 2024-09-06 PROCEDURE — 700105 HCHG RX REV CODE 258: Performed by: NURSE PRACTITIONER

## 2024-09-06 PROCEDURE — 80053 COMPREHEN METABOLIC PANEL: CPT

## 2024-09-06 PROCEDURE — 82077 ASSAY SPEC XCP UR&BREATH IA: CPT

## 2024-09-06 PROCEDURE — 36415 COLL VENOUS BLD VENIPUNCTURE: CPT

## 2024-09-06 PROCEDURE — 700101 HCHG RX REV CODE 250

## 2024-09-06 PROCEDURE — 86900 BLOOD TYPING SEROLOGIC ABO: CPT

## 2024-09-06 PROCEDURE — A9270 NON-COVERED ITEM OR SERVICE: HCPCS | Performed by: EMERGENCY MEDICINE

## 2024-09-06 PROCEDURE — 86850 RBC ANTIBODY SCREEN: CPT

## 2024-09-06 PROCEDURE — 700101 HCHG RX REV CODE 250: Performed by: NURSE PRACTITIONER

## 2024-09-06 PROCEDURE — 85730 THROMBOPLASTIN TIME PARTIAL: CPT

## 2024-09-06 PROCEDURE — 99222 1ST HOSP IP/OBS MODERATE 55: CPT | Performed by: FAMILY MEDICINE

## 2024-09-06 PROCEDURE — 770020 HCHG ROOM/CARE - TELE (206)

## 2024-09-06 PROCEDURE — 84300 ASSAY OF URINE SODIUM: CPT

## 2024-09-06 PROCEDURE — 85027 COMPLETE CBC AUTOMATED: CPT

## 2024-09-06 PROCEDURE — A9270 NON-COVERED ITEM OR SERVICE: HCPCS

## 2024-09-06 PROCEDURE — 84443 ASSAY THYROID STIM HORMONE: CPT

## 2024-09-06 PROCEDURE — A9270 NON-COVERED ITEM OR SERVICE: HCPCS | Performed by: NURSE PRACTITIONER

## 2024-09-06 PROCEDURE — 700102 HCHG RX REV CODE 250 W/ 637 OVERRIDE(OP)

## 2024-09-06 PROCEDURE — 85347 COAGULATION TIME ACTIVATED: CPT

## 2024-09-06 PROCEDURE — 82607 VITAMIN B-12: CPT

## 2024-09-06 PROCEDURE — 86901 BLOOD TYPING SEROLOGIC RH(D): CPT

## 2024-09-06 PROCEDURE — 93005 ELECTROCARDIOGRAM TRACING: CPT | Performed by: SURGERY

## 2024-09-06 PROCEDURE — 85576 BLOOD PLATELET AGGREGATION: CPT

## 2024-09-06 PROCEDURE — G0390 TRAUMA RESPONS W/HOSP CRITI: HCPCS

## 2024-09-06 PROCEDURE — 85610 PROTHROMBIN TIME: CPT

## 2024-09-06 PROCEDURE — 99223 1ST HOSP IP/OBS HIGH 75: CPT | Mod: AI | Performed by: SURGERY

## 2024-09-06 PROCEDURE — 85384 FIBRINOGEN ACTIVITY: CPT

## 2024-09-06 PROCEDURE — 99291 CRITICAL CARE FIRST HOUR: CPT

## 2024-09-06 PROCEDURE — 84703 CHORIONIC GONADOTROPIN ASSAY: CPT

## 2024-09-06 PROCEDURE — 84100 ASSAY OF PHOSPHORUS: CPT

## 2024-09-06 PROCEDURE — 700102 HCHG RX REV CODE 250 W/ 637 OVERRIDE(OP): Performed by: NURSE PRACTITIONER

## 2024-09-06 PROCEDURE — 83735 ASSAY OF MAGNESIUM: CPT

## 2024-09-06 PROCEDURE — 700102 HCHG RX REV CODE 250 W/ 637 OVERRIDE(OP): Performed by: EMERGENCY MEDICINE

## 2024-09-06 PROCEDURE — 70450 CT HEAD/BRAIN W/O DYE: CPT

## 2024-09-06 PROCEDURE — 93306 TTE W/DOPPLER COMPLETE: CPT | Mod: 26 | Performed by: INTERNAL MEDICINE

## 2024-09-06 PROCEDURE — 93005 ELECTROCARDIOGRAM TRACING: CPT | Performed by: EMERGENCY MEDICINE

## 2024-09-06 RX ORDER — METOPROLOL TARTRATE 25 MG/1
25 TABLET, FILM COATED ORAL 2 TIMES DAILY
Status: DISCONTINUED | OUTPATIENT
Start: 2024-09-06 | End: 2024-09-06

## 2024-09-06 RX ORDER — SODIUM CHLORIDE 9 MG/ML
INJECTION, SOLUTION INTRAVENOUS CONTINUOUS
Status: DISCONTINUED | OUTPATIENT
Start: 2024-09-06 | End: 2024-09-07

## 2024-09-06 RX ORDER — ACETAMINOPHEN 500 MG
1000 TABLET ORAL ONCE
Status: COMPLETED | OUTPATIENT
Start: 2024-09-06 | End: 2024-09-06

## 2024-09-06 RX ORDER — OXYCODONE HYDROCHLORIDE 5 MG/1
2.5 TABLET ORAL
Status: DISCONTINUED | OUTPATIENT
Start: 2024-09-06 | End: 2024-09-08 | Stop reason: HOSPADM

## 2024-09-06 RX ORDER — HYDROMORPHONE HYDROCHLORIDE 1 MG/ML
0.25 INJECTION, SOLUTION INTRAMUSCULAR; INTRAVENOUS; SUBCUTANEOUS
Status: DISCONTINUED | OUTPATIENT
Start: 2024-09-06 | End: 2024-09-07

## 2024-09-06 RX ORDER — SODIUM PHOSPHATE,MONO-DIBASIC 19G-7G/118
1 ENEMA (ML) RECTAL
Status: DISCONTINUED | OUTPATIENT
Start: 2024-09-06 | End: 2024-09-08 | Stop reason: HOSPADM

## 2024-09-06 RX ORDER — METOPROLOL TARTRATE 25 MG/1
25 TABLET, FILM COATED ORAL 3 TIMES DAILY
Status: DISCONTINUED | OUTPATIENT
Start: 2024-09-06 | End: 2024-09-08

## 2024-09-06 RX ORDER — ATENOLOL 25 MG/1
50 TABLET ORAL
Status: DISCONTINUED | OUTPATIENT
Start: 2024-09-06 | End: 2024-09-06

## 2024-09-06 RX ORDER — AMOXICILLIN 250 MG
1 CAPSULE ORAL
Status: DISCONTINUED | OUTPATIENT
Start: 2024-09-06 | End: 2024-09-08 | Stop reason: HOSPADM

## 2024-09-06 RX ORDER — ACETAMINOPHEN 500 MG
500-1000 TABLET ORAL
COMMUNITY

## 2024-09-06 RX ORDER — IBUPROFEN 200 MG
600 CAPSULE ORAL DAILY
COMMUNITY

## 2024-09-06 RX ORDER — POLYETHYLENE GLYCOL 3350 17 G/17G
1 POWDER, FOR SOLUTION ORAL 2 TIMES DAILY
Status: DISCONTINUED | OUTPATIENT
Start: 2024-09-06 | End: 2024-09-08 | Stop reason: HOSPADM

## 2024-09-06 RX ORDER — GABAPENTIN 100 MG/1
100 CAPSULE ORAL EVERY 8 HOURS
Status: DISCONTINUED | OUTPATIENT
Start: 2024-09-06 | End: 2024-09-08 | Stop reason: HOSPADM

## 2024-09-06 RX ORDER — M-VIT,TX,IRON,MINS/CALC/FOLIC 27MG-0.4MG
1 TABLET ORAL DAILY
COMMUNITY

## 2024-09-06 RX ORDER — ONDANSETRON 2 MG/ML
4 INJECTION INTRAMUSCULAR; INTRAVENOUS EVERY 4 HOURS PRN
Status: DISCONTINUED | OUTPATIENT
Start: 2024-09-06 | End: 2024-09-08 | Stop reason: HOSPADM

## 2024-09-06 RX ORDER — BISACODYL 10 MG
10 SUPPOSITORY, RECTAL RECTAL
Status: DISCONTINUED | OUTPATIENT
Start: 2024-09-06 | End: 2024-09-08 | Stop reason: HOSPADM

## 2024-09-06 RX ORDER — CHLORAL HYDRATE 500 MG
1000 CAPSULE ORAL 2 TIMES DAILY
COMMUNITY

## 2024-09-06 RX ORDER — ASPIRIN 325 MG
325-650 TABLET ORAL
Status: ON HOLD | COMMUNITY
End: 2024-09-08

## 2024-09-06 RX ORDER — LIDOCAINE 4 G/G
1-3 PATCH TOPICAL EVERY 24 HOURS
Status: DISCONTINUED | OUTPATIENT
Start: 2024-09-06 | End: 2024-09-08 | Stop reason: HOSPADM

## 2024-09-06 RX ORDER — ACETAMINOPHEN 325 MG/1
650 TABLET ORAL EVERY 4 HOURS PRN
Status: DISCONTINUED | OUTPATIENT
Start: 2024-09-06 | End: 2024-09-08 | Stop reason: HOSPADM

## 2024-09-06 RX ORDER — OXYCODONE HYDROCHLORIDE 5 MG/1
5 TABLET ORAL
Status: DISCONTINUED | OUTPATIENT
Start: 2024-09-06 | End: 2024-09-08 | Stop reason: HOSPADM

## 2024-09-06 RX ORDER — METOPROLOL TARTRATE 1 MG/ML
5 INJECTION, SOLUTION INTRAVENOUS
Status: COMPLETED | OUTPATIENT
Start: 2024-09-06 | End: 2024-09-06

## 2024-09-06 RX ORDER — DOCUSATE SODIUM 100 MG/1
100 CAPSULE, LIQUID FILLED ORAL 2 TIMES DAILY
Status: DISCONTINUED | OUTPATIENT
Start: 2024-09-06 | End: 2024-09-08 | Stop reason: HOSPADM

## 2024-09-06 RX ORDER — ACETAMINOPHEN 325 MG/1
650 TABLET ORAL EVERY 6 HOURS
Status: DISCONTINUED | OUTPATIENT
Start: 2024-09-06 | End: 2024-09-08 | Stop reason: HOSPADM

## 2024-09-06 RX ORDER — CLONAZEPAM 0.5 MG/1
0.5 TABLET ORAL
Status: ON HOLD | COMMUNITY
End: 2024-09-08

## 2024-09-06 RX ORDER — ASCORBIC ACID 500 MG
500 TABLET ORAL DAILY
COMMUNITY

## 2024-09-06 RX ORDER — ONDANSETRON 4 MG/1
4 TABLET, ORALLY DISINTEGRATING ORAL EVERY 4 HOURS PRN
Status: DISCONTINUED | OUTPATIENT
Start: 2024-09-06 | End: 2024-09-08 | Stop reason: HOSPADM

## 2024-09-06 RX ORDER — AMOXICILLIN 250 MG
1 CAPSULE ORAL NIGHTLY
Status: DISCONTINUED | OUTPATIENT
Start: 2024-09-06 | End: 2024-09-08 | Stop reason: HOSPADM

## 2024-09-06 RX ORDER — LEVETIRACETAM 500 MG/5ML
500 INJECTION, SOLUTION, CONCENTRATE INTRAVENOUS EVERY 12 HOURS
Status: DISCONTINUED | OUTPATIENT
Start: 2024-09-06 | End: 2024-09-08 | Stop reason: HOSPADM

## 2024-09-06 RX ORDER — LEVETIRACETAM 500 MG/1
500 TABLET ORAL EVERY 12 HOURS
Status: DISCONTINUED | OUTPATIENT
Start: 2024-09-06 | End: 2024-09-08 | Stop reason: HOSPADM

## 2024-09-06 RX ORDER — MAGNESIUM OXIDE 400 MG/1
400 TABLET ORAL DAILY
COMMUNITY
End: 2024-09-12

## 2024-09-06 RX ORDER — METAXALONE 800 MG/1
400 TABLET ORAL 3 TIMES DAILY
Status: DISCONTINUED | OUTPATIENT
Start: 2024-09-06 | End: 2024-09-08 | Stop reason: HOSPADM

## 2024-09-06 RX ORDER — ATENOLOL 50 MG/1
1 TABLET ORAL
Status: ON HOLD | COMMUNITY
End: 2024-09-08

## 2024-09-06 RX ADMIN — DOCUSATE SODIUM 100 MG: 100 CAPSULE, LIQUID FILLED ORAL at 17:40

## 2024-09-06 RX ADMIN — ACETAMINOPHEN 1000 MG: 500 TABLET ORAL at 02:18

## 2024-09-06 RX ADMIN — SODIUM CHLORIDE: 9 INJECTION, SOLUTION INTRAVENOUS at 02:48

## 2024-09-06 RX ADMIN — LIDOCAINE 2 PATCH: 4 PATCH TOPICAL at 08:35

## 2024-09-06 RX ADMIN — GABAPENTIN 100 MG: 100 CAPSULE ORAL at 08:35

## 2024-09-06 RX ADMIN — METAXALONE 400 MG: 800 TABLET ORAL at 13:03

## 2024-09-06 RX ADMIN — ACETAMINOPHEN 650 MG: 325 TABLET ORAL at 13:03

## 2024-09-06 RX ADMIN — METOPROLOL TARTRATE 25 MG: 25 TABLET, FILM COATED ORAL at 05:06

## 2024-09-06 RX ADMIN — OXYCODONE HYDROCHLORIDE 5 MG: 5 TABLET ORAL at 10:08

## 2024-09-06 RX ADMIN — METAXALONE 400 MG: 800 TABLET ORAL at 17:40

## 2024-09-06 RX ADMIN — GABAPENTIN 100 MG: 100 CAPSULE ORAL at 13:05

## 2024-09-06 RX ADMIN — GABAPENTIN 100 MG: 100 CAPSULE ORAL at 20:47

## 2024-09-06 RX ADMIN — ACETAMINOPHEN 650 MG: 325 TABLET ORAL at 17:40

## 2024-09-06 RX ADMIN — ACETAMINOPHEN 650 MG: 325 TABLET ORAL at 08:35

## 2024-09-06 RX ADMIN — METOPROLOL TARTRATE 5 MG: 5 INJECTION INTRAVENOUS at 02:44

## 2024-09-06 RX ADMIN — SENNOSIDES AND DOCUSATE SODIUM 1 TABLET: 50; 8.6 TABLET ORAL at 20:47

## 2024-09-06 RX ADMIN — METAXALONE 400 MG: 800 TABLET ORAL at 08:35

## 2024-09-06 RX ADMIN — OXYCODONE HYDROCHLORIDE 5 MG: 5 TABLET ORAL at 04:09

## 2024-09-06 RX ADMIN — METOPROLOL TARTRATE 5 MG: 5 INJECTION INTRAVENOUS at 18:14

## 2024-09-06 RX ADMIN — DOCUSATE SODIUM 100 MG: 100 CAPSULE, LIQUID FILLED ORAL at 05:06

## 2024-09-06 RX ADMIN — POLYETHYLENE GLYCOL 3350 1 PACKET: 17 POWDER, FOR SOLUTION ORAL at 05:06

## 2024-09-06 RX ADMIN — LEVETIRACETAM 500 MG: 500 TABLET, FILM COATED ORAL at 17:40

## 2024-09-06 RX ADMIN — METOPROLOL TARTRATE 25 MG: 25 TABLET, FILM COATED ORAL at 17:40

## 2024-09-06 RX ADMIN — LEVETIRACETAM 500 MG: 500 TABLET, FILM COATED ORAL at 05:06

## 2024-09-06 ASSESSMENT — ENCOUNTER SYMPTOMS
PALPITATIONS: 0
FLANK PAIN: 0
NERVOUS/ANXIOUS: 0
NECK PAIN: 1
WHEEZING: 0
DOUBLE VISION: 0
HEADACHES: 1
ABDOMINAL PAIN: 0
VOMITING: 0
COUGH: 0
MYALGIAS: 1
NECK PAIN: 0
FOCAL WEAKNESS: 0
MYALGIAS: 0
FEVER: 0
WEAKNESS: 1
TINGLING: 0
CHILLS: 0
HEARTBURN: 0
DIZZINESS: 0
NAUSEA: 0
HEADACHES: 0
WEAKNESS: 0
BACK PAIN: 1
DIAPHORESIS: 0
SHORTNESS OF BREATH: 0
SORE THROAT: 0
DIARRHEA: 0
BLURRED VISION: 0
SENSORY CHANGE: 0
SPEECH CHANGE: 0

## 2024-09-06 ASSESSMENT — PAIN DESCRIPTION - PAIN TYPE
TYPE: ACUTE PAIN

## 2024-09-06 ASSESSMENT — COPD QUESTIONNAIRES
DURING THE PAST 4 WEEKS HOW MUCH DID YOU FEEL SHORT OF BREATH: NONE/LITTLE OF THE TIME
COPD SCREENING SCORE: 3
HAVE YOU SMOKED AT LEAST 100 CIGARETTES IN YOUR ENTIRE LIFE: NO/DON'T KNOW
DO YOU EVER COUGH UP ANY MUCUS OR PHLEGM?: NO/ONLY WITH OCCASIONAL COLDS OR INFECTIONS

## 2024-09-06 ASSESSMENT — FIBROSIS 4 INDEX: FIB4 SCORE: 2.22

## 2024-09-06 NOTE — PROGRESS NOTES
Trauma / Surgical Daily Progress Note    Date of Service  9/6/2024    Chief Complaint  78 y.o. female admitted 9/6/2024 with subdural hematoma and subarachnoid hemorrhages after a GLF    Interval Events  Tertiary survey complete.   Reports soreness throughout.  Minimal headache.    - Stable for transfer to [facility] on cardiac monitor  - Initiated multimodal.  - Advance diet as tolerated.  - Geriatric and cardiology consult    Review of Systems  Review of Systems   Constitutional:  Positive for malaise/fatigue. Negative for chills and fever.   Eyes:  Negative for blurred vision and double vision.   Respiratory:  Negative for cough and shortness of breath.    Cardiovascular:  Negative for chest pain.   Gastrointestinal:  Negative for abdominal pain, nausea and vomiting.   Genitourinary:  Negative for dysuria.   Musculoskeletal:  Positive for back pain (baseline) and myalgias. Negative for joint pain and neck pain.   Neurological:  Negative for tingling, sensory change, weakness and headaches.   All other systems reviewed and are negative.       Vital Signs  Temp:  [36 °C (96.8 °F)-36.3 °C (97.4 °F)] 36.3 °C (97.4 °F)  Pulse:  [] 113  Resp:  [11-35] 25  BP: (112-171)/() 135/70  SpO2:  [94 %-98 %] 98 %    Physical Exam  Physical Exam  Vitals and nursing note reviewed. Chaperone present: family at bedside.   Constitutional:       General: She is sleeping. She is not in acute distress.     Appearance: She is not ill-appearing.      Interventions: Nasal cannula in place.   HENT:      Head: Normocephalic and atraumatic.      Right Ear: External ear normal.      Left Ear: External ear normal.      Nose: Nose normal.      Mouth/Throat:      Mouth: Mucous membranes are dry.      Pharynx: Oropharynx is clear.   Eyes:      Conjunctiva/sclera: Conjunctivae normal.      Pupils: Pupils are equal, round, and reactive to light.   Cardiovascular:      Rate and Rhythm: Normal rate. Rhythm irregular.      Pulses: Normal  pulses.      Heart sounds: Normal heart sounds.   Pulmonary:      Effort: Pulmonary effort is normal. No respiratory distress.      Breath sounds: Decreased breath sounds present.   Abdominal:      General: There is no distension.      Palpations: Abdomen is soft.      Tenderness: There is no abdominal tenderness. There is no guarding or rebound.   Musculoskeletal:         General: Normal range of motion.      Cervical back: Normal range of motion. No tenderness.      Right lower leg: No edema.      Left lower leg: No edema.      Comments: Sore throughout. Baseline back pain due to herniated disks   Skin:     General: Skin is warm and dry.      Capillary Refill: Capillary refill takes less than 2 seconds.      Findings: Bruising present.      Comments: abrasion   Neurological:      Mental Status: She is easily aroused.      GCS: GCS eye subscore is 4. GCS verbal subscore is 5. GCS motor subscore is 6.      Sensory: Sensation is intact.      Motor: Motor function is intact.   Psychiatric:         Mood and Affect: Mood normal.         Behavior: Behavior normal.         Judgment: Judgment normal.         Laboratory  Recent Results (from the past 24 hour(s))   EKG    Collection Time: 24  1:46 AM   Result Value Ref Range    Report       [facility] Emergency Dept.    Test Date:  2024  Pt Name:    PIPER YOUNG               Department: ER  MRN:        [id number]                      Room:       TRAUMA - EXAM 2  Gender:     Female                       Technician: [id number]  :        [id number]                   Requested By:ESSIE LOPES II  Order #:    [id number]                    Reading MD:    Measurements  Intervals                                Axis  Rate:       119                          P:          0  IA:         0                            QRS:        38  QRSD:       100                          T:          66  QT:         379  QTc:        534    Interpretive  Statements  Atrial fibrillation  Ventricular tachycardia, unsustained  RSR' in V1 or V2, right VCD or RVH  No previous ECG available for comparison     CBC WITHOUT DIFFERENTIAL    Collection Time: 09/06/24  1:47 AM   Result Value Ref Range    WBC 14.2 (H) 4.8 - 10.8 K/uL    RBC 4.47 4.20 - 5.40 M/uL    Hemoglobin 14.2 12.0 - 16.0 g/dL    Hematocrit 41.6 37.0 - 47.0 %    MCV 93.1 81.4 - 97.8 fL    MCH 31.8 27.0 - 33.0 pg    MCHC 34.1 32.2 - 35.5 g/dL    RDW 43.8 35.9 - 50.0 fL    Platelet Count 201 164 - 446 K/uL    MPV 9.7 9.0 - 12.9 fL   Prothrombin Time    Collection Time: 09/06/24  1:47 AM   Result Value Ref Range    PT 12.9 12.0 - 14.6 sec    INR 0.96 0.87 - 1.13   APTT    Collection Time: 09/06/24  1:47 AM   Result Value Ref Range    APTT 32.1 24.7 - 36.0 sec   HCG QUAL SERUM    Collection Time: 09/06/24  1:47 AM   Result Value Ref Range    Beta-Hcg Qualitative Serum Negative Negative   PLATELET MAPPING WITH BASIC TEG    Collection Time: 09/06/24  1:47 AM   Result Value Ref Range    Reaction Time Initial-R 5.2 4.6 - 9.1 min    React Time Initial Hep 4.6 4.3 - 8.3 min    Clot Kinetics-K 1.3 0.8 - 2.1 min    Clot Angle-Angle 73.4 63.0 - 78.0 degrees    Maximum Clot Strength-MA 56.1 52.0 - 69.0 mm    TEG Functional Fibrinogen(MA) 17.7 15.0 - 32.0 mm    Lysis 30 minutes-LY30 1.7 0.0 - 2.6 %    % Inhibition ADP 64.2 (H) 0.0 - 17.0 %    % Inhibition AA 90.1 (H) 0.0 - 11.0 %    TEG Algorithm Link Algorithm    COD - Adult (Type and Screen)    Collection Time: 09/06/24  1:47 AM   Result Value Ref Range    ABO Grouping Only A     Rh Grouping Only POS     Antibody Screen-Cod NEG    TSH    Collection Time: 09/06/24  1:47 AM   Result Value Ref Range    TSH 2.100 0.350 - 5.500 uIU/mL   VITAMIN B12    Collection Time: 09/06/24  1:47 AM   Result Value Ref Range    Vitamin B12 -True Cobalamin 1106 (H) 211 - 911 pg/mL   ABO Rh Confirm    Collection Time: 09/06/24  2:22 AM   Result Value Ref Range    ABO Rh Confirm A POS    Comp  Metabolic Panel    Collection Time: 09/06/24  2:22 AM   Result Value Ref Range    Sodium 132 (L) 135 - 145 mmol/L    Potassium 4.7 3.6 - 5.5 mmol/L    Chloride 101 96 - 112 mmol/L    Co2 20 20 - 33 mmol/L    Anion Gap 11.0 7.0 - 16.0    Glucose 130 (H) 65 - 99 mg/dL    Bun 24 (H) 8 - 22 mg/dL    Creatinine 0.51 0.50 - 1.40 mg/dL    Calcium 9.6 8.5 - 10.5 mg/dL    Correct Calcium 9.8 8.5 - 10.5 mg/dL    AST(SGOT) 39 12 - 45 U/L    ALT(SGPT) 49 2 - 50 U/L    Alkaline Phosphatase 69 30 - 99 U/L    Total Bilirubin 0.5 0.1 - 1.5 mg/dL    Albumin 3.8 3.2 - 4.9 g/dL    Total Protein 6.8 6.0 - 8.2 g/dL    Globulin 3.0 1.9 - 3.5 g/dL    A-G Ratio 1.3 g/dL   ESTIMATED GFR    Collection Time: 09/06/24  2:22 AM   Result Value Ref Range    GFR (CKD-EPI) 94 >60 mL/min/1.73 m 2   DIAGNOSTIC ALCOHOL    Collection Time: 09/06/24  2:22 AM   Result Value Ref Range    Diagnostic Alcohol <10.1 <10.1 mg/dL   MAGNESIUM    Collection Time: 09/06/24  2:22 AM   Result Value Ref Range    Magnesium 2.2 1.5 - 2.5 mg/dL   PHOSPHORUS    Collection Time: 09/06/24  2:22 AM   Result Value Ref Range    Phosphorus 3.9 2.5 - 4.5 mg/dL   EC-ECHOCARDIOGRAM COMPLETE W/O CONT    Collection Time: 09/06/24  1:15 PM   Result Value Ref Range    Eject.Frac. MOD BP 46.68     Eject.Frac. MOD 4C 43.81     Eject.Frac. MOD 2C 49.64     Left Ventrical Ejection Fraction 50        Fluids    Intake/Output Summary (Last 24 hours) at 9/6/2024 1443  Last data filed at 9/6/2024 1400  Gross per 24 hour   Intake 602.1 ml   Output 0 ml   Net 602.1 ml       Core Measures & Quality Metrics  Core Measures & Quality Metrics  JAVI Score  ETOH Screening    Assessment/Plan  * Trauma- (present on admission)  Assessment & Plan  Fell backwards in a garage. No loss of consciousness.  Trauma Yellow Transfer Activation from [facility] in [place], [place].  Silas Cartagena MD. Trauma Surgery.    Atrial fibrillation (HCC)- (present on admission)  Assessment &  Plan  Found on EKG at sending facility. No history per patient, unknown duration  EKG on arrival with atrial fibrillation with rate 130s - 140s.  Metoprolol IV now for rate control, followed with 25 mg PO BID.  Will need anticoagulation  9/6 Cardiology consult placed. Recommendation to switch atenolol to metoprolol  - ECHO and TSH pending.    Traumatic intracranial hemorrhage without loss of consciousness (HCC)- (present on admission)  Assessment & Plan  Repeat head CT on arrival with stable left parafalcine subdural hematoma  Left frontal and parietal medial subarachnoid hemorrhages  Right medial subarachnoid hemorrhages, stable.  Non-operative management.  Post traumatic pharmacologic seizure prophylaxis for 1 week.  Speech Language Pathology cognitive evaluation.  Morro Lucas MD. Neurosurgeon. [facility].    Encounter for geriatric assessment- (present on admission)  Assessment & Plan  9/6 The patient is 75 years old or older and a geriatrics consult is indicated  Sean Cardozo MD, Geriatric Hospitalist.    Hyponatremia- (present on admission)  Assessment & Plan  Admission sodium 132. Could be baseline.  Monitor.    Hypertension- (present on admission)  Assessment & Plan  Chronic condition treated with atenolol.   Held per cardiology recommendation. Initiated metoprolol    Contraindication to deep vein thrombosis (DVT) prophylaxis- (present on admission)  Assessment & Plan  VTE prophylaxis initially contraindicated secondary to elevated bleeding risk.  9/8 Trauma surveillance venous duplex ultrasonography ordered.    Aspirin long-term use- (present on admission)  Assessment & Plan  Takes aspirin 81 mg.  TEG with platelet mapping with AA inhibition 90%.  Repeat head CT and neuro checks stable. GCS 15.   Platelet transfusion deferred.    Chronic neck pain- (present on admission)  Assessment & Plan  History of herniated disks.    Mental status adequate for full examination?: Yes    Spine cleared  (radiologically and/or clinically): Yes    All current laboratory studies/radiology exams reviewed: Yes    Medications reconciliation has been reviewed: Yes    Completed Consultations:  Cardiology  Geriatrics  Neurosurgery     Pending Consultations:  None    Newly identified diagnoses, injuries and/or co-morbidities:  Bilateral shoulder pain - full ROM, refusing imaging.    PDI 3        Discussed patient condition with RN, Patient, and trauma surgery, Dr. Potter.

## 2024-09-06 NOTE — DISCHARGE PLANNING
Trauma Response    Referral: Trauma Yellow Response    Intervention: SW responded to trauma yellow.  Pt was BIB [facility] after GLF, SDH/SAH.  Pt was alert upon arrival.  Pts name is Joy Sanchezlivan (:  1945).  SW obtained the following pt information: SW escorted the pt family to the pt room when they arrived.      Linda (daughter) [phone] or [phone]    Plan: MARY will remain available for pt support.

## 2024-09-06 NOTE — ASSESSMENT & PLAN NOTE
VTE prophylaxis initially contraindicated secondary to elevated bleeding risk.  9/8 Trauma surveillance venous duplex ultrasonography ordered.

## 2024-09-06 NOTE — DISCHARGE PLANNING
Care Transition Team Assessment    LMSW met w/ pt and pt's daughter, Linda, at bedside to introduce self and role and complete assessment. Pt is A/Ox4 and agreeable to assessment. Pt confirms that information on Facesheet is accurate. Pt resides alone in a one-story home in [place], [place]. Pt's older sister, Julia Corona, resides nearby and pt's daughter resides 70 miles away in [place]. Pt is independent in all ADLs/IADLs and does not use any DME at baseline. Pt's PCP is JADYN Méndez. Pt provided copy of Medicare and [employer] insurance cards. Pt also provided a copy of her Emergency Assistance Plus card. All copies placed in Media Tab.     Information Source  Orientation Level: Oriented X4  Information Given By: Patient, Relative  Who is responsible for making decisions for patient? : Patient    Readmission Evaluation  Is this a readmission?: No    Elopement Risk  Legal Hold: No  Ambulatory or Self Mobile in Wheelchair: No-Not an Elopement Risk  Elopement Risk: Not at Risk for Elopement       Discharge Preparedness  What is your plan after discharge?: Home with help  What are your discharge supports?: Sibling, Child  Prior Functional Level: Ambulatory, Drives Self, Independent with Activities of Daily Living, Independent with Medication Management  Difficulity with ADLs: None  Difficulity with IADLs: None    Functional Assesment  Prior Functional Level: Ambulatory, Drives Self, Independent with Activities of Daily Living, Independent with Medication Management    Finances  Financial Barriers to Discharge: No  Prescription Coverage: Yes    Vision / Hearing Impairment  Right Eye Vision: Impaired, Wears Glasses  Left Eye Vision: Impaired, Wears Glasses    Advance Directive  Advance Directive?: None    Domestic Abuse  Have you ever been the victim of abuse or violence?: No    Psychological Assessment  History of Substance Abuse: None  History of Psychiatric Problems: No    Discharge Risks or Barriers  Discharge  risks or barriers?: No    Anticipated Discharge Information  Discharge Disposition: Discharged to home/self care (01)

## 2024-09-06 NOTE — PROGRESS NOTES
Patient brought to 933 via gurney with this ACLS RN accompanied by pt's family. Patient was continuously monitored and satting greater than 90% on room air. Upon arrival to 933, patient stood steadily and denied any lightheadedness/dizziness and transferred to an ICU bed via shuffeling. Patient was medicated per MAR with metoprolol and NS fluid was started. Patient educated on the use of call light and is resting comfortably with normal rise and fall of chest, call light and all personal belongings are within reach, bed alarm is set and x3 bed rails are up. Patient was medicated for pain the shoulders and neck per MAR. Patient has no further needs at this time and verbalized that she will utilize call button if she has any needs.     3916 Dr. Byrd notified of pt's TEG results.

## 2024-09-06 NOTE — ASSESSMENT & PLAN NOTE
9/6 The patient is 75 years old or older and a geriatrics consult is indicated  Sean Cardozo MD, Geriatric Hospitalist.

## 2024-09-06 NOTE — H&P
TRAUMA HISTORY AND PHYSICAL    DATE OF SERVICE: 9/6/2024    ACTIVATION LEVEL: Yellow Transfer.     HISTORY OF PRESENT ILLNESS: The patient is a 78 year-old female who was injured after a fall.     The patient was initially evaluated at [facility] in [place], [place] where CT imaging demonstrated head trauma. She was transported to [facility] in [place], [place] for a definitive neurotrauma evaluation.    The patient is awake with reassuring vitals.  Denies headache, nausea, and dizziness.    PAST MEDICAL HISTORY:   Past Medical History:   Diagnosis Date    Hypertension        PAST SURGICAL HISTORY: History reviewed. No pertinent surgical history.       ALLERGIES: Codeine       CURRENT MEDICATIONS:   Home Medications       Reviewed by Kyle Yip (Pharmacy Tech) on 09/06/24 at 0236  Med List Status: Complete     Medication Last Dose Status   acetaminophen (TYLENOL) 500 MG Tab FEW DAYS AGO Active   ascorbic acid (VITAMIN C) 500 MG tablet UNK Active   aspirin (ASA) 325 MG Tab 9/5/2024 Active   atenolol (TENORMIN) 50 MG Tab 9/4/2024 Active   B Complex Vitamins (VITAMIN B COMPLEX PO) UNK Active   Calcium 600 MG Tab UNK Active   magnesium oxide (MAG-OX) 400 MG Tab tablet UNK Active   Omega-3 Fatty Acids (FISH OIL) 1000 MG Cap capsule UNK Active   therapeutic multivitamin-minerals (THERAGRAN-M) Tab UNK Active                  Audit from Redirected Encounters    **Home medications have not yet been reviewed for this encounter**        FAMILY HISTORY:   Reviewed and found to be non-contributory in regards to the above presentation    SOCIAL HISTORY:  reports that she has never smoked. She has never used smokeless tobacco. She reports that she does not drink alcohol and does not use drugs.      REVIEW OF SYSTEMS:   Comprehensive review of systems is negative with the exception of the aforementioned HPI, PMH, and PSH bullets in accordance with CMS guidelines.    PHYSICAL  "EXAMINATION:   Vital Signs: /64   Pulse 65   Temp 36 °C (96.8 °F) (Temporal)   Resp 12   Ht 1.575 m (5' 2\")   Wt 65 kg (143 lb 4.8 oz)   SpO2 96%   GENERAL:  Otherwise healthy-appearing and in no acute distress    HEENT:    HEAD: Soft posterior scalp hematoma, non-expanding  EARS: Normal pinna bilaterally.  External auditory canals are without discharge. No hemotympanum.   EYES: Conjunctivae and sclerae are clear. Extraocular movements are full. Pupils are equal, round, and reactive to light.    NOSE: No rhinorrhea  THROAT: Oral mucosa is moist.    FACE: The midface and jaw are stable. No malocclusion of bite is evident on visual inspection    NECK:  Soft and supple without lymphadenopathy. No masses are noted.  Trachea is midline.  There is no cervical crepitance. Palpation of the posterior bony spine demonstrates non midline tenderness.     CHEST:  Lungs are clear to auscultation bilaterally. Symmetrical rise with respiration.  No chest wall tenderness or instability.  No crepitance.  No wounds, lacerations, or excoriations.    CARDIOVASCULAR:  Regular rate and rhythm.  No jugulo-venous distention.  Palpable pulses present in all four extremities.      ABDOMEN:  Soft, non-tender, non-distended.  Non-tympanitic.  No wounds, lacerations, or excoriations.    BACK/PELVIS:    Thoracic Vertebrae - non tender with palpation, no stepoffs.  Lumbar Vertebrae - non tender with palpation, no stepoffs.  Sacrum - non tender with palpation  Pelvic Wings - non tender with palpation    RECTAL:  Deferred    GENITOURINARY:  Normal external reproductive anatomy.    EXTREMITIES:  RIGHT ARM: Without deformities, wounds, lacerations, or excoriations.  Full passive and active range of motion without pain.  LEFT ARM: Without deformities, wounds, lacerations, or excoriations.  Full passive and active range of motion without pain.  RIGHT LEG: Without deformities, wounds, lacerations, or excoriations.  Full passive and active " range of motion without pain.  LEFT LEG: Without deformities, wounds, lacerations, or excoriations.  Full passive and active range of motion without pain.    NEUROLOGIC:  Jeremias Coma Score 15. Cranial nerves II through XII are grossly intact. Motor and sensory exams are normal in all four extremities. Motor and sensory reflexes are 2+ and symmetric with bilateral plantar responses.    PSYCHIATRIC: Affect and mood is appropriate for age and condition.    LABORATORY VALUES:   Recent Labs     09/06/24 0147   WBC 14.2*   RBC 4.47   HEMOGLOBIN 14.2   HEMATOCRIT 41.6   MCV 93.1   MCH 31.8   MCHC 34.1   RDW 43.8   PLATELETCT 201   MPV 9.7     Recent Labs     09/06/24 0222   SODIUM 132*   POTASSIUM 4.7   CHLORIDE 101   CO2 20   GLUCOSE 130*   BUN 24*   CREATININE 0.51   CALCIUM 9.6     Recent Labs     09/06/24 0147 09/06/24 0222   ASTSGOT  --  39   ALTSGPT  --  49   TBILIRUBIN  --  0.5   ALKPHOSPHAT  --  69   GLOBULIN  --  3.0   INR 0.96  --      Recent Labs     09/06/24 0147   APTT 32.1   INR 0.96        IMAGING:   CT-HEAD W/O   Final Result         1.  Stable left parafalcine subdural hematoma   2.  Left frontal and parietal medial subarachnoid hemorrhages   3.  Right medial subarachnoid hemorrhages, stable   4.  Nonspecific white matter changes, commonly associated with small vessel ischemic disease.  Associated mild cerebral atrophy is noted.   5.  Atherosclerosis.      Based solely on CT findings, the brain injury guideline category is mBIG 3.      EDH   IVH   Displaced skull fx   SDH > 8mm   IPH > 8mm or multiple   SAH bi-hemispheric or > 3mm      The original BIG retrospective analysis found radiographic progression in 0% of BIG 1 patients and 2.6% BIG 2.            OUTSIDE IMAGES-DX CHEST   Final Result      OUTSIDE IMAGES-CT CERVICAL SPINE   Final Result      OUTSIDE IMAGES-CT HEAD   Final Result      Images and imaging reports from the  were reviewed personally.      IMPRESSION AND PLAN:  * Trauma-  (present on admission)  Assessment & Plan  Fell backwards in a garage. No loss of consciousness.  Trauma Yellow Transfer Activation from [facility] in [place], [place].  Silas Cartagena MD. Trauma Surgery.    Atrial fibrillation (HCC)- (present on admission)  Assessment & Plan  Found on EKG at sending facility. No history per patient, unknown duration  EKG on arrival with atrial fibrillation with rate 130s - 140s.  Metoprolol IV now for rate control, followed with 25 mg PO BID.  Will need anticoagulation  Consider Cards consult in am    Traumatic intracranial hemorrhage without loss of consciousness (HCC)- (present on admission)  Assessment & Plan  Repeat head CT on arrival with stable left parafalcine subdural hematoma  Left frontal and parietal medial subarachnoid hemorrhages  Right medial subarachnoid hemorrhages, stable.  Non-operative management.  Post traumatic pharmacologic seizure prophylaxis for 1 week.  Speech Language Pathology cognitive evaluation.  Morro Lucas MD. Neurosurgeon. [facility].    Contraindication to deep vein thrombosis (DVT) prophylaxis- (present on admission)  Assessment & Plan  VTE prophylaxis initially contraindicated secondary to elevated bleeding risk.  9/8 Trauma surveillance venous duplex ultrasonography ordered.    Aspirin long-term use- (present on admission)  Assessment & Plan  Takes aspirin 81 mg.  TEG with platelet mapping with AA inhibition 90%.  Repeat head CT and neuro checks stable. GCS 15.   Platelet transfusion deferred.        Aggregated care time spent evaluating, reviewing documentation, providing care, and managing this patient exclusive of procedures: 60 minutes  ____________________________________   Silas Byrd M.D.     MAAME / VONNIE     DD: 9/6/2024   DT: 6:24 AM

## 2024-09-06 NOTE — ED NOTES
BIB REACH # 23. Trauma yellow transfer from [facility]. Patient fell backwards and hit head in garage, no LOC, GCS 15, + thinners (PRN ASA). At OSH patient has + SDH/SAH. Hematoma to back of head.   Hx: afib, NDA codeine.    No c-collar in place, no straps, no board.   1g IV tylenol, 25 mcg fentanyl,   20g L AC

## 2024-09-06 NOTE — ASSESSMENT & PLAN NOTE
Repeat head CT on arrival with stable left parafalcine subdural hematoma  Left frontal and parietal medial subarachnoid hemorrhages  Right medial subarachnoid hemorrhages, stable.  Non-operative management.  Post traumatic pharmacologic seizure prophylaxis for 1 week.  Speech Language Pathology cognitive evaluation.  Morro Lucas MD. Neurosurgeon. [facility].

## 2024-09-06 NOTE — ASSESSMENT & PLAN NOTE
Mechanical fall.  Trauma Yellow Transfer Activation from [facility] in [place], [place].  Silas Cartagena MD. Trauma Surgery.

## 2024-09-06 NOTE — ED NOTES
Med rec complete per patient  Allergies reviewed.   Outpatient antibiotics in the last 30 days? No   Anticoagulants taken in the last 14 days? No   Patient reports she takes Aspirin for headaches about 3 to 4 times per week    Pharmacy patient utilizes: [facility] in [place] [phone]    Iva Orlando CPhT

## 2024-09-06 NOTE — CARE PLAN
The patient is Stable - Low risk of patient condition declining or worsening    Problem: Fall Risk  Goal: Patient will remain free from falls  Outcome: Progressing     Problem: Pain - Standard  Goal: Alleviation of pain or a reduction in pain to the patient’s comfort goal  Outcome: Progressing     Shift Goals  Clinical Goals: stable neuro exam, mobility, pain control  Patient Goals: comfort  Family Goals: updates, comfort    Progress made toward(s) clinical / shift goals:  met    Patient is not progressing towards the following goals:

## 2024-09-06 NOTE — CONSULTS
Surgery Neurosurgery Consultation    Date of Service  9/6/2024    Referring Physician  Silas Byrd M.D.    Consulting Physician  Morro Lucas M.D.    Reason for Consultation  Traumatic subarachnoid hemorrhage  Traumatic intraparenchymal hemorrhage  Traumatic subdural hematoma    History of Presenting Illness  78 y.o. female who presented 9/6/2024 with trace traumatic subarachnoid hemorrhage, intraparenchymal hemorrhage and subdural hematoma in the interhemispheric fissure after sustaining a fall on 09/05/2024.  The patient endorses headache but is otherwise doing well.  She occasionally takes aspirin    Review of Systems  Review of Systems   All other systems reviewed and are negative.      Past Medical History   has a past medical history of Hypertension.    Surgical History   has no past surgical history on file.    Family History  family history is not on file.    Social History   reports that she has never smoked. She has never used smokeless tobacco. She reports that she does not drink alcohol and does not use drugs.    Medications  Prior to Admission Medications   Prescriptions Last Dose Informant Patient Reported? Taking?   B Complex Vitamins (VITAMIN B COMPLEX PO) UNK at UNK Patient Yes No   Sig: Take 1 Tablet by mouth every day.   Calcium 600 MG Tab UNK at UNK Patient Yes Yes   Sig: Take 600 mg by mouth every day.   Omega-3 Fatty Acids (FISH OIL) 1000 MG Cap capsule UNK at UNK Patient Yes No   Sig: Take 1,000 mg by mouth 2 times a day.   acetaminophen (TYLENOL) 500 MG Tab FEW DAYS AGO at PRN Patient Yes Yes   Sig: Take 500-1,000 mg by mouth 1 time a day as needed for Mild Pain.   ascorbic acid (VITAMIN C) 500 MG tablet UNK at UNK Patient Yes No   Sig: Take 500 mg by mouth every day.   aspirin (ASA) 325 MG Tab 9/5/2024 at PM Patient Yes Yes   Sig: Take 325-650 mg by mouth 1 time a day as needed (Headache).   atenolol (TENORMIN) 50 MG Tab 9/4/2024 at PM Patient Yes No   Sig: Take 1 Tablet by mouth  every day.   magnesium oxide (MAG-OX) 400 MG Tab tablet UNK at UNK Patient Yes Yes   Sig: Take 400 mg by mouth every day.   therapeutic multivitamin-minerals (THERAGRAN-M) Tab UNK at UNK Patient Yes Yes   Sig: Take 1 Tablet by mouth every day.      Facility-Administered Medications: None       Allergies  Allergies   Allergen Reactions    Codeine        Physical Exam  Temp:  [36 °C (96.8 °F)-36.1 °C (96.9 °F)] 36 °C (96.8 °F)  Pulse:  [] 65  Resp:  [12-35] 12  BP: (114-171)/() 114/64  SpO2:  [94 %-96 %] 96 %    Physical Exam  Vitals and nursing note reviewed.   Constitutional:       Appearance: Normal appearance.   HENT:      Right Ear: External ear normal.      Left Ear: External ear normal.      Mouth/Throat:      Mouth: Mucous membranes are moist.      Pharynx: Oropharynx is clear.   Eyes:      Extraocular Movements: Extraocular movements intact.      Pupils: Pupils are equal, round, and reactive to light.   Musculoskeletal:      Cervical back: Normal range of motion and neck supple.   Neurological:      General: No focal deficit present.      Mental Status: She is alert and oriented to person, place, and time.      Sensory: No sensory deficit.      Motor: No weakness.   Psychiatric:         Mood and Affect: Mood normal.         Behavior: Behavior normal.         Thought Content: Thought content normal.         Judgment: Judgment normal.         Fluids      Laboratory  Recent Labs     09/06/24 0147   WBC 14.2*   RBC 4.47   HEMOGLOBIN 14.2   HEMATOCRIT 41.6   MCV 93.1   MCH 31.8   MCHC 34.1   RDW 43.8   PLATELETCT 201   MPV 9.7     Recent Labs     09/06/24  0222   SODIUM 132*   POTASSIUM 4.7   CHLORIDE 101   CO2 20   GLUCOSE 130*   BUN 24*   CREATININE 0.51   CALCIUM 9.6     Recent Labs     09/06/24 0147   APTT 32.1   INR 0.96                 Imaging  CT-HEAD W/O   Final Result         1.  Stable left parafalcine subdural hematoma   2.  Left frontal and parietal medial subarachnoid hemorrhages   3.   Right medial subarachnoid hemorrhages, stable   4.  Nonspecific white matter changes, commonly associated with small vessel ischemic disease.  Associated mild cerebral atrophy is noted.   5.  Atherosclerosis.      Based solely on CT findings, the brain injury guideline category is mBIG 3.      EDH   IVH   Displaced skull fx   SDH > 8mm   IPH > 8mm or multiple   SAH bi-hemispheric or > 3mm      The original BIG retrospective analysis found radiographic progression in 0% of BIG 1 patients and 2.6% BIG 2.            OUTSIDE IMAGES-DX CHEST   Final Result      OUTSIDE IMAGES-CT CERVICAL SPINE   Final Result      OUTSIDE IMAGES-CT HEAD   Final Result          Assessment/Plan  Follow-up CT stable.  Trace traumatic subarachnoid hemorrhage, intraparenchymal hemorrhage and interhemispheric subdural hematoma.  She is neurologically intact.  Okay for every 4 hours neurochecks, okay to transition to floor, work on PT/OT, general diet and work on disposition.  No antiplatelets or NSAIDs for total of 6 weeks.  I discussed this with the daughter and patient.  She can follow-up at [facility] in 3 weeks with a noncontrast head CT.  Please call with any questions or concerns

## 2024-09-06 NOTE — ASSESSMENT & PLAN NOTE
Recommend change atenolol to metoprolol  Recommend IV labetalol hydralazine as needed with parameters

## 2024-09-06 NOTE — ASSESSMENT & PLAN NOTE
Takes aspirin 81 mg PRN for headaches.  TEG with platelet mapping with AA inhibition 90%.  Repeat head CT and neuro checks stable. GCS 15.   Platelet transfusion deferred.  Hold ASA for 6 weeks post injury per neurosurgery.

## 2024-09-06 NOTE — ED PROVIDER NOTES
ER Provider Note    Scribed for Marcellus Henry Ii, M.d. by Parish Corona. 9/6/2024  1:50 AM    Primary Care Provider: No primary care provider noted.    CHIEF COMPLAINT   Chief Complaint   Patient presents with    Trauma Yellow     EXTERNAL RECORDS REVIEWED  External ED Note Reviewed CT impression from transfer facility which showed a 5.4 mm left parafalcine subdural hematoma. She also has a subarachnoid hemorrhage in the right frontal lobe. Ct-cspine showed no fracture.     HPI/ROS  LIMITATION TO HISTORY   Select: : None  OUTSIDE HISTORIAN(S):  EMS present at [facility] provides history    Joy Daniel is a 78 y.o. female who presents to the ED via EMS for evaluation of a ground level fall onset yesterday. EMS reports the patient stumbled in her garage yesterday, falling backward and striking the back of her head on the ground. The patient was seen at [facility] where she was found to have a subarachnoid hemorrhage and sent here for further evaluation. The patient has a history of hypertension and takes Losartan, atorvastatin, and PRN baby aspirin.     PAST MEDICAL HISTORY  Past Medical History:   Diagnosis Date    Hypertension      SURGICAL HISTORY  History reviewed. No pertinent surgical history.    FAMILY HISTORY  History reviewed. No pertinent family history.    SOCIAL HISTORY   reports that she has never smoked. She has never used smokeless tobacco. She reports that she does not drink alcohol and does not use drugs.    CURRENT MEDICATIONS  Current Discharge Medication List        CONTINUE these medications which have NOT CHANGED    Details   aspirin (ASA) 325 MG Tab Take 325-650 mg by mouth 1 time a day as needed (Headache).      acetaminophen (TYLENOL) 500 MG Tab Take 500-1,000 mg by mouth 1 time a day as needed for Mild Pain.      Calcium 600 MG Tab Take 600 mg by mouth every day.      magnesium oxide (MAG-OX) 400 MG Tab tablet Take 400 mg by mouth every day.      therapeutic  "multivitamin-minerals (THERAGRAN-M) Tab Take 1 Tablet by mouth every day.      atenolol (TENORMIN) 50 MG Tab Take 1 Tablet by mouth every day.      ascorbic acid (VITAMIN C) 500 MG tablet Take 500 mg by mouth every day.      B Complex Vitamins (VITAMIN B COMPLEX PO) Take 1 Tablet by mouth every day.      Omega-3 Fatty Acids (FISH OIL) 1000 MG Cap capsule Take 1,000 mg by mouth 2 times a day.           ALLERGIES  Patient has no allergy information on record.    PHYSICAL EXAM  BP (!) 153/90   Pulse 80   Temp 36 °C (96.8 °F)   Resp 20   Ht 1.575 m (5' 2\")   Wt 61.2 kg (135 lb)   SpO2 95%   BMI 24.69 kg/m²   Physical Exam  Vitals and nursing note reviewed.   Constitutional:       Appearance: Normal appearance.   HENT:      Head:      Comments: Posterior scalp hematoma     Nose: No congestion.      Mouth/Throat:      Mouth: Mucous membranes are moist.   Eyes:      Extraocular Movements: Extraocular movements intact.      Conjunctiva/sclera: Conjunctivae normal.      Pupils: Pupils are equal, round, and reactive to light.   Cardiovascular:      Rate and Rhythm: Normal rate. Rhythm irregular.   Pulmonary:      Effort: Pulmonary effort is normal.      Breath sounds: Normal breath sounds.   Abdominal:      General: There is no distension.      Tenderness: There is no abdominal tenderness.   Musculoskeletal:      Cervical back: Normal range of motion.   Neurological:      General: No focal deficit present.      Mental Status: She is alert and oriented to person, place, and time.      Cranial Nerves: No cranial nerve deficit.      Comments: Moving all extremities with normal strength   Psychiatric:         Mood and Affect: Mood normal.      DIAGNOSTIC STUDIES    EKG/LABS  Results for orders placed or performed during the hospital encounter of 09/06/24   CBC WITHOUT DIFFERENTIAL   Result Value Ref Range    WBC 14.2 (H) 4.8 - 10.8 K/uL    RBC 4.47 4.20 - 5.40 M/uL    Hemoglobin 14.2 12.0 - 16.0 g/dL    Hematocrit 41.6 " 37.0 - 47.0 %    MCV 93.1 81.4 - 97.8 fL    MCH 31.8 27.0 - 33.0 pg    MCHC 34.1 32.2 - 35.5 g/dL    RDW 43.8 35.9 - 50.0 fL    Platelet Count 201 164 - 446 K/uL    MPV 9.7 9.0 - 12.9 fL   Prothrombin Time   Result Value Ref Range    PT 12.9 12.0 - 14.6 sec    INR 0.96 0.87 - 1.13   APTT   Result Value Ref Range    APTT 32.1 24.7 - 36.0 sec   HCG QUAL SERUM   Result Value Ref Range    Beta-Hcg Qualitative Serum Negative Negative   COD - Adult (Type and Screen)   Result Value Ref Range    ABO Grouping Only A     Rh Grouping Only POS     Antibody Screen-Cod NEG    EKG   Result Value Ref Range    Report       [facility] Emergency Dept.    Test Date:  2024  Pt Name:    PIPER YOUNG               Department: ER  MRN:        [id number]                      Room:       TRAUMA - EXAM 2  Gender:     Female                       Technician: [id number]  :        [id number]                   Requested By:ESSIE LOPES II  Order #:    [id number]                    Reading MD:    Measurements  Intervals                                Axis  Rate:       119                          P:          0  NM:         0                            QRS:        38  QRSD:       100                          T:          66  QT:         379  QTc:        534    Interpretive Statements  Atrial fibrillation  Ventricular tachycardia, unsustained  RSR' in V1 or V2, right VCD or RVH  No previous ECG available for comparison        I have independently interpreted this EKG    RADIOLOGY/PROCEDURES   The attending emergency physician has independently interpreted the diagnostic imaging associated with this visit and am waiting the final reading from the radiologist.   My preliminary interpretation is a follows: Parafalcine hemorrhage.    Radiologist interpretation:  CT-HEAD W/O   Final Result         1.  Stable left parafalcine subdural hematoma   2.  Left frontal and parietal medial subarachnoid hemorrhages   3.  Right medial  subarachnoid hemorrhages, stable   4.  Nonspecific white matter changes, commonly associated with small vessel ischemic disease.  Associated mild cerebral atrophy is noted.   5.  Atherosclerosis.      Based solely on CT findings, the brain injury guideline category is mBIG 3.      EDH   IVH   Displaced skull fx   SDH > 8mm   IPH > 8mm or multiple   SAH bi-hemispheric or > 3mm      The original BIG retrospective analysis found radiographic progression in 0% of BIG 1 patients and 2.6% BIG 2.            OUTSIDE IMAGES-DX CHEST   Final Result      OUTSIDE IMAGES-CT CERVICAL SPINE   Final Result      OUTSIDE IMAGES-CT HEAD   Final Result        COURSE & MEDICAL DECISION MAKING     ASSESSMENT, COURSE AND PLAN  Care Narrative:   1:37 AM - Patient seen and examined at trauma bay as a trauma yellow. This is a 78 y.o. woman who presents as a transfer from [facility] with a subarachnoid and subdural hemorrhage due to a ground level fall. GCS 15. Takes 81mg aspirin dailiy as needed but unclear for what specific reason. Also found to have irregular rhythm consistent with atrial fibrillation that is rate controlled.  It has been over 4 hours since scan and she is going to CT for repeat head imaging. She will be admitted to trauma surgery service. I will discuss with Neurosurgery pending results on CT. Labs with Teg pending.     2:36 AM -CT shows stable hemorrhage.  Paged Neurosurgery.     2:51 AM - I discussed the patient's case and the above findings with Dr. Hammond (Neurosurgery) who agrees with the plan of care, she does not need any immediate interventions.       CRITICAL CARE  The very real possibilty of a deterioration of this patient's condition required the highest level of my preparedness for sudden, emergent intervention.  I provided critical care services, which included medication orders, frequent reevaluations of the patient's condition and response to treatment, ordering and reviewing test results, and  discussing the case with various consultants.  The critical care time associated with the care of the patient was 30 minutes. Review chart for interventions. This time is exclusive of any other billable procedures.        PROBLEM LIST  # Traumatic intracranial hemorrhage    DISPOSITION AND DISCUSSIONS  I have discussed management of the patient with the following physicians and ONESIMO's:  Dr. Byrd (Trauma Surgery), Dr. Hammond (Neurosurgery)     Discussion of management with other [facility] or appropriate source(s): None     DISPOSITION:  Patient will be hospitalized by Dr. Byrd in serious condition.     FINAL DIAGNOSIS  1. SAH (subarachnoid hemorrhage) (HCC)    2. SDH (subdural hematoma) (HCC)    3. Closed head injury, initial encounter    4. Atrial fibrillation, unspecified type (HCC)       IParish (Scribjeffrey), am scribing for, and in the presence of, JENNIFER Varghese II.    Electronically signed by: Parish Corona (Scribe), 9/6/2024    IMarcellus II, M* personally performed the services described in this documentation, as scribed by Parish Corona in my presence, and it is both accurate and complete.      The note accurately reflects work and decisions made by me.  Marcellus Henry II, M.D.  9/6/2024  8:03 AM

## 2024-09-06 NOTE — CONSULTS
Geriatric Medicine Consultation  Date of Service 9/6/2024    Referring Physician  Silas Byrd M.D.    Consulting Physician  Sean Cardozo M.D.    Reason for Consultation  Fall    History of Presenting Illness  78 y.o. female who presented 9/6/2024 with fall resulting in traumatic subdural hemorrhage, subarachnoid hemorrhage.  Trauma surgery admitted the patient to the ICU.  Neurosurgery was consulted on the case.  Patient takes aspirin about 3-4 times per week for headaches and chronic neck pain.  Patient denies frequent falls, states that she has stumbled a few times recently.  Denies any use of assistive device.  Her daughter states that she has not been feeling well for the past 2 months.  She denies any palpitations, chest pain, lightheadedness or dizziness.  On admission she was noted to be have atrial fibrillation.  Patient and daughter denies any history of this.  She does admit to symptoms of possible sleep apnea.  Patient denies any cognitive impairment, she does have vision and hearing impairment, wears corrective glasses, but has not had a hearing test.  She has some urinary incontinence but usually makes it to the bathroom.  She is independent in ADLs and IADLs, and continues to drive.    Review of Systems  Review of Systems   Constitutional:  Negative for chills, diaphoresis, fever and malaise/fatigue.   HENT:  Negative for congestion, hearing loss and sore throat.    Eyes:  Negative for blurred vision.   Respiratory:  Negative for cough, shortness of breath and wheezing.    Cardiovascular:  Negative for chest pain, palpitations and leg swelling.   Gastrointestinal:  Negative for abdominal pain, diarrhea, heartburn, nausea and vomiting.   Genitourinary:  Negative for dysuria, flank pain and hematuria.   Musculoskeletal:  Positive for back pain and neck pain. Negative for joint pain and myalgias.   Skin:  Negative for rash.   Neurological:  Positive for weakness and headaches. Negative for  dizziness, sensory change, speech change and focal weakness.   Psychiatric/Behavioral:  The patient is not nervous/anxious.        Past Medical History   has a past medical history of Hypertension.    Surgical History   has no past surgical history on file.    Family History  family history is not on file.    Social History   reports that she has never smoked. She has never used smokeless tobacco. She reports that she does not drink alcohol and does not use drugs.    Living situation - lives alone  Marital status - Single  Primary caregiver - Self  Transportation - Drives  POLST                   No   Health care POA   Yes   Financial POA       Yes     Medications  Prior to Admission Medications   Prescriptions Last Dose Informant Patient Reported? Taking?   B Complex Vitamins (VITAMIN B COMPLEX PO) UNK at UNK Patient Yes No   Sig: Take 1 Tablet by mouth every day.   Calcium 600 MG Tab UNK at UNK Patient Yes Yes   Sig: Take 600 mg by mouth every day.   Omega-3 Fatty Acids (FISH OIL) 1000 MG Cap capsule UNK at UNK Patient Yes No   Sig: Take 1,000 mg by mouth 2 times a day.   acetaminophen (TYLENOL) 500 MG Tab FEW DAYS AGO at PRN Patient Yes Yes   Sig: Take 500-1,000 mg by mouth 1 time a day as needed for Mild Pain.   ascorbic acid (VITAMIN C) 500 MG tablet UNK at UNK Patient Yes No   Sig: Take 500 mg by mouth every day.   aspirin (ASA) 325 MG Tab 9/5/2024 at PM Patient Yes Yes   Sig: Take 325-650 mg by mouth 1 time a day as needed (Headache).   atenolol (TENORMIN) 50 MG Tab 9/4/2024 at PM Patient Yes No   Sig: Take 1 Tablet by mouth every day.   magnesium oxide (MAG-OX) 400 MG Tab tablet UNK at UNK Patient Yes Yes   Sig: Take 400 mg by mouth every day.   therapeutic multivitamin-minerals (THERAGRAN-M) Tab UNK at UNK Patient Yes Yes   Sig: Take 1 Tablet by mouth every day.      Facility-Administered Medications: None       Allergies  Allergies   Allergen Reactions    Codeine        Geriatric Screening    ADL  assistance              No   IADL assistance             No   Cognitive Impairment    No   Mood disorder                No   Polypharmacy                Yes  Vision impairment         Yes  Hearing impairment      Yes  Fall                                  Yes  Assistive device            No   Urinary incontinence    Yes  Nutrition issues            No   Food Insecurity            No   Pressure ulcer              No     Physical Exam  Temp:  [36 °C (96.8 °F)-36.1 °C (96.9 °F)] 36.1 °C (96.9 °F)  Pulse:  [] 99  Resp:  [11-35] 11  BP: (112-171)/() 146/63  SpO2:  [94 %-97 %] 96 %  Physical Exam  Vitals and nursing note reviewed.   HENT:      Head: Normocephalic.      Nose: No congestion.      Mouth/Throat:      Mouth: Mucous membranes are moist.   Eyes:      Extraocular Movements: Extraocular movements intact.      Conjunctiva/sclera: Conjunctivae normal.   Cardiovascular:      Rate and Rhythm: Tachycardia present. Rhythm irregular.   Pulmonary:      Effort: Pulmonary effort is normal.      Breath sounds: Normal breath sounds.   Abdominal:      General: There is no distension.      Tenderness: There is no abdominal tenderness. There is no guarding or rebound.   Musculoskeletal:      Cervical back: No tenderness.      Right lower leg: No edema.      Left lower leg: No edema.   Skin:     General: Skin is warm and dry.   Neurological:      General: No focal deficit present.      Mental Status: She is alert and oriented to person, place, and time.      Cranial Nerves: No cranial nerve deficit.           CAM  Acute onset and fluctuating course   No   Inattention                                         No   Disorganized thinking                        No   Altered level of consciousness          No       Laboratory  Recent Labs     09/06/24  0147   WBC 14.2*   RBC 4.47   HEMOGLOBIN 14.2   HEMATOCRIT 41.6   MCV 93.1   MCH 31.8   MCHC 34.1   RDW 43.8   PLATELETCT 201   MPV 9.7     Recent Labs     09/06/24  0222    SODIUM 132*   POTASSIUM 4.7   CHLORIDE 101   CO2 20   GLUCOSE 130*   BUN 24*   CREATININE 0.51   CALCIUM 9.6     Recent Labs     09/06/24  0147   APTT 32.1   INR 0.96               Imaging  CT-HEAD W/O   Final Result         1.  Stable left parafalcine subdural hematoma   2.  Left frontal and parietal medial subarachnoid hemorrhages   3.  Right medial subarachnoid hemorrhages, stable   4.  Nonspecific white matter changes, commonly associated with small vessel ischemic disease.  Associated mild cerebral atrophy is noted.   5.  Atherosclerosis.      Based solely on CT findings, the brain injury guideline category is mBIG 3.      EDH   IVH   Displaced skull fx   SDH > 8mm   IPH > 8mm or multiple   SAH bi-hemispheric or > 3mm      The original BIG retrospective analysis found radiographic progression in 0% of BIG 1 patients and 2.6% BIG 2.            OUTSIDE IMAGES-DX CHEST   Final Result      OUTSIDE IMAGES-CT CERVICAL SPINE   Final Result      OUTSIDE IMAGES-CT HEAD   Final Result      EC-ECHOCARDIOGRAM COMPLETE W/O CONT    (Results Pending)       Assessment/Plan  * Trauma- (present on admission)  Assessment & Plan  Trauma surgery-primary service    Atrial fibrillation with RVR (HCC)- (present on admission)  Assessment & Plan  Recommend change Atenolol to metoprolol 25 mg 3 times daily with parameters  Recommend to start IV metoprolol as needed for sustained heart rate greater than 130  Recommend to check echocardiogram, TSH  [id number] - 4  Unable to start anticoagulation with SDH/SAH  Will need sleep study as outpatient    Fall- (present on admission)  Assessment & Plan  PT and OT  Check vitamin B12 and TSH    Traumatic intracranial hemorrhage without loss of consciousness (HCC)- (present on admission)  Assessment & Plan  Neurochecks  Keppra  PT and OT    Chronic neck pain- (present on admission)  Assessment & Plan  Pain control    Leukocytosis- (present on admission)  Assessment & Plan  Follow  cbc    Hyponatremia- (present on admission)  Assessment & Plan  IVF NS  Follow BMP, check TSH  Check urine sodium    Hypertension- (present on admission)  Assessment & Plan  Recommend change atenolol to metoprolol  Recommend IV labetalol hydralazine as needed with parameters

## 2024-09-06 NOTE — ASSESSMENT & PLAN NOTE
Recommend change Atenolol to metoprolol 25 mg 3 times daily with parameters  Recommend to start IV metoprolol as needed for sustained heart rate greater than 130  Recommend to check echocardiogram, TSH  [id number] - 4  Unable to start anticoagulation with SDH/SAH  Will need sleep study as outpatient

## 2024-09-06 NOTE — ED NOTES
Pt medicated per MAR, education provided, pt verbalized understanding.    Second ABO and green top collected and sent.   Pt transferring to T933/01 via rGalvin on cardiac monitor with TICU RN, pt A&Ox4,on room air, belongings within possession.

## 2024-09-06 NOTE — PROGRESS NOTES
Cardiology Note:    Joy Daniel is a 78 y.o. female with a past medical history of hypertension (atenolol 50 mg daily) who presented 9/6/2024 with a fall that resulted in traumatic subarachnoid hemorrhage, intraparenchymal hemorrhage and interhemispheric subdural hematoma. She takes aspirin 3-4 times per week for headaches.    Cardiology was consulted because the patient was noted to be in atrial fibrillation at an outside facility.  No prior history of atrial fibrillation per chart review.  Patient denies history of atrial fibrillation.    Currently, patient is in sinus tachycardia with frequent PVCs and bigeminy.  I reviewed the telemetry here since the arrival and she did not have atrial fibrillation.  EKG taken at the ER at [facility] demonstrates sinus rhythm with a rate 119 with frequent PVCs.    I personally reviewed the outside facility records that did not include an EKG or telemetry strips.  There were no notes about the presence of atrial fibrillation by the emergency department physician.  I contacted [facility] and received a record of an EKG by fax and my personal interpretation of the rhythm demonstrates sinus rhythm, rate 106, with frequent PVCs.  This EKG from an outside facility will be scanned to media for records.    As far as the frequent ectopy, such as PVC's, the hospitalist services already have appropriate recommendations in place to change atenolol to metoprolol, check echocardiogram, and check TSH level.    Should there be an occurrence of atrial fibrillation during her hospital stay, do not hesitate to contact cardiology services.        ALIREZA Jacques.  [facility]  [phone]

## 2024-09-06 NOTE — CARE PLAN
The patient is Watcher - Medium risk of patient condition declining or worsening    Shift Goals  Clinical Goals: Stable Q1 Neuro Exams  Patient Goals: Rest  Family Goals: Updates, Comfort    Progress made toward(s) clinical / shift goals:    Problem: Fall Risk  Goal: Patient will remain free from falls  Description: Target End Date:  Prior to discharge or change in level of care  Document interventions on the Jia Guerra Fall Risk Assessment  1.  Assess for fall risk factors  2.  Implement fall precautions  Outcome: Progressing   Patient educated on the importance of use of call light before getting out of bed to ensure safe ambulation. Patient verbalized understanding.     Problem: Pain - Standard  Goal: Alleviation of pain or a reduction in pain to the patient’s comfort goal  Description: Target End Date:  Prior to discharge or change in level of care  Document on Vitals flowsheet  1.  Document pain using the appropriate pain scale per order or unit policy  2.  Educate and implement non-pharmacologic comfort measures (i.e. relaxation, distraction, massage, cold/heat therapy, etc.)  3.  Pain management medications as ordered  4.  Reassess pain after pain med administration per policy  5.  If opiods administered assess patient's response to pain medication is appropriate per POSS sedation scale  6.  Follow pain management plan developed in collaboration with patient and interdisciplinary team (including palliative care or pain specialists if applicable)  Outcome: Progressing   Pain assessments Q1.

## 2024-09-06 NOTE — ED TRIAGE NOTES
"Chief Complaint   Patient presents with    Trauma Yellow       [facility] to trauma bay for TRAUMA YELLOW TRANSFER, pt on monitor and in gown, labs drawn and sent. Pt consists of: pt transfer from [facility]. Pt fell backwards, head head in garage. -LOC, +thinners (ASA - PRN). H/o afib, HTN. Seen by ERP upon arrival, see trauma narrator for assessment. Pt roomed to B13.     Medications given en route:1g APAP, 25 mcg fentanyl     BP (!) 153/90   Pulse 80   Temp 36 °C (96.8 °F)   Resp 20   Ht 1.575 m (5' 2\")   Wt 61.2 kg (135 lb)   SpO2 95%   BMI 24.69 kg/m²     "

## 2024-09-06 NOTE — PROGRESS NOTES
4 Eyes Skin Assessment Completed by MATTHIEU Osman and MATTHIEU Whyte.      Head hematoma to posterior temporal head   Ears WDL  Nose WDL  Mouth WDL  Neck WDL  Breast/Chest WDL  Shoulder Blades Redness and Blanching  Spine WDL  (R) Arm/Elbow/Hand Redness and Blanching elbow   (L) Arm/Elbow/Hand Redness and Blanching elbow  Abdomen Redness and Blanching  Groin WDL  Scrotum/Coccyx/Buttocks Redness and Blanching  (R) Leg WDL  (L) Leg WDL  (R) Heel/Foot/Toe WDL  (L) Heel/Foot/Toe WDL          Devices In Places ECG, Blood Pressure Cuff, Pulse Ox, and SCD's      Interventions In Place Sacral Mepilex, TAP System, Pillows, Low Air Loss Mattress, and Dri-Rigo Pads    Possible Skin Injury No    Pictures Uploaded Into Epic N/A  Wound Consult Placed N/A  RN Wound Prevention Protocol Ordered Yes

## 2024-09-06 NOTE — ASSESSMENT & PLAN NOTE
EKG on arrival with atrial fibrillation with rate 130s - 140s.  9/6 Cardiology consulted, no atrial fibrillation by interpretation on admit EKG. Repeat EKG with sinus rhythm. Recs changing atenolol to metoprolol.  Echocardiogram with LVEF 50%.  9/7 Possible 1 second degree heart block on cardiac monitoring.   9/8 Formal Cardiology consult recs stopping beta blocker therapy & no need for Zio patch. Outpatient cardiology referral placed.  Remote telemetry.

## 2024-09-07 PROBLEM — M54.9 CHRONIC NECK AND BACK PAIN: Status: ACTIVE | Noted: 2024-09-06

## 2024-09-07 PROBLEM — E87.1 HYPONATREMIA: Status: RESOLVED | Noted: 2024-09-06 | Resolved: 2024-09-07

## 2024-09-07 LAB
ALBUMIN SERPL BCP-MCNC: 3.5 G/DL (ref 3.2–4.9)
ALBUMIN/GLOB SERPL: 1.5 G/DL
ALP SERPL-CCNC: 61 U/L (ref 30–99)
ALT SERPL-CCNC: 36 U/L (ref 2–50)
ANION GAP SERPL CALC-SCNC: 9 MMOL/L (ref 7–16)
AST SERPL-CCNC: 16 U/L (ref 12–45)
BASOPHILS # BLD AUTO: 0.7 % (ref 0–1.8)
BASOPHILS # BLD: 0.04 K/UL (ref 0–0.12)
BILIRUB SERPL-MCNC: 0.7 MG/DL (ref 0.1–1.5)
BUN SERPL-MCNC: 9 MG/DL (ref 8–22)
CALCIUM ALBUM COR SERPL-MCNC: 8.9 MG/DL (ref 8.5–10.5)
CALCIUM SERPL-MCNC: 8.5 MG/DL (ref 8.5–10.5)
CHLORIDE SERPL-SCNC: 104 MMOL/L (ref 96–112)
CO2 SERPL-SCNC: 23 MMOL/L (ref 20–33)
CREAT SERPL-MCNC: 0.42 MG/DL (ref 0.5–1.4)
EKG IMPRESSION: NORMAL
EOSINOPHIL # BLD AUTO: 0.36 K/UL (ref 0–0.51)
EOSINOPHIL NFR BLD: 6.3 % (ref 0–6.9)
ERYTHROCYTE [DISTWIDTH] IN BLOOD BY AUTOMATED COUNT: 43.8 FL (ref 35.9–50)
GFR SERPLBLD CREATININE-BSD FMLA CKD-EPI: 98 ML/MIN/1.73 M 2
GLOBULIN SER CALC-MCNC: 2.4 G/DL (ref 1.9–3.5)
GLUCOSE SERPL-MCNC: 99 MG/DL (ref 65–99)
HCT VFR BLD AUTO: 39.5 % (ref 37–47)
HGB BLD-MCNC: 13 G/DL (ref 12–16)
IMM GRANULOCYTES # BLD AUTO: 0.01 K/UL (ref 0–0.11)
IMM GRANULOCYTES NFR BLD AUTO: 0.2 % (ref 0–0.9)
LYMPHOCYTES # BLD AUTO: 2.04 K/UL (ref 1–4.8)
LYMPHOCYTES NFR BLD: 36 % (ref 22–41)
MCH RBC QN AUTO: 31.1 PG (ref 27–33)
MCHC RBC AUTO-ENTMCNC: 32.9 G/DL (ref 32.2–35.5)
MCV RBC AUTO: 94.5 FL (ref 81.4–97.8)
MONOCYTES # BLD AUTO: 0.41 K/UL (ref 0–0.85)
MONOCYTES NFR BLD AUTO: 7.2 % (ref 0–13.4)
NEUTROPHILS # BLD AUTO: 2.81 K/UL (ref 1.82–7.42)
NEUTROPHILS NFR BLD: 49.6 % (ref 44–72)
NRBC # BLD AUTO: 0 K/UL
NRBC BLD-RTO: 0 /100 WBC (ref 0–0.2)
PLATELET # BLD AUTO: 166 K/UL (ref 164–446)
PMV BLD AUTO: 9.4 FL (ref 9–12.9)
POTASSIUM SERPL-SCNC: 4.3 MMOL/L (ref 3.6–5.5)
PROT SERPL-MCNC: 5.9 G/DL (ref 6–8.2)
RBC # BLD AUTO: 4.18 M/UL (ref 4.2–5.4)
SODIUM SERPL-SCNC: 136 MMOL/L (ref 135–145)
SODIUM UR-SCNC: 122 MMOL/L
TROPONIN T SERPL-MCNC: 8 NG/L (ref 6–19)
WBC # BLD AUTO: 5.7 K/UL (ref 4.8–10.8)

## 2024-09-07 PROCEDURE — 700102 HCHG RX REV CODE 250 W/ 637 OVERRIDE(OP): Performed by: NURSE PRACTITIONER

## 2024-09-07 PROCEDURE — 84484 ASSAY OF TROPONIN QUANT: CPT

## 2024-09-07 PROCEDURE — 36415 COLL VENOUS BLD VENIPUNCTURE: CPT

## 2024-09-07 PROCEDURE — 700101 HCHG RX REV CODE 250

## 2024-09-07 PROCEDURE — 770020 HCHG ROOM/CARE - TELE (206)

## 2024-09-07 PROCEDURE — 99232 SBSQ HOSP IP/OBS MODERATE 35: CPT

## 2024-09-07 PROCEDURE — 80053 COMPREHEN METABOLIC PANEL: CPT

## 2024-09-07 PROCEDURE — 97163 PT EVAL HIGH COMPLEX 45 MIN: CPT

## 2024-09-07 PROCEDURE — 93005 ELECTROCARDIOGRAM TRACING: CPT

## 2024-09-07 PROCEDURE — A9270 NON-COVERED ITEM OR SERVICE: HCPCS | Performed by: NURSE PRACTITIONER

## 2024-09-07 PROCEDURE — 85025 COMPLETE CBC W/AUTO DIFF WBC: CPT

## 2024-09-07 PROCEDURE — 92523 SPEECH SOUND LANG COMPREHEN: CPT

## 2024-09-07 PROCEDURE — A9270 NON-COVERED ITEM OR SERVICE: HCPCS

## 2024-09-07 PROCEDURE — 97166 OT EVAL MOD COMPLEX 45 MIN: CPT

## 2024-09-07 PROCEDURE — 700102 HCHG RX REV CODE 250 W/ 637 OVERRIDE(OP)

## 2024-09-07 RX ADMIN — POLYETHYLENE GLYCOL 3350 1 PACKET: 17 POWDER, FOR SOLUTION ORAL at 04:35

## 2024-09-07 RX ADMIN — DOCUSATE SODIUM 100 MG: 100 CAPSULE, LIQUID FILLED ORAL at 04:35

## 2024-09-07 RX ADMIN — ACETAMINOPHEN 650 MG: 325 TABLET ORAL at 04:35

## 2024-09-07 RX ADMIN — ACETAMINOPHEN 650 MG: 325 TABLET ORAL at 11:47

## 2024-09-07 RX ADMIN — METOPROLOL TARTRATE 25 MG: 25 TABLET, FILM COATED ORAL at 17:20

## 2024-09-07 RX ADMIN — METOPROLOL TARTRATE 25 MG: 25 TABLET, FILM COATED ORAL at 04:35

## 2024-09-07 RX ADMIN — POLYETHYLENE GLYCOL 3350 1 PACKET: 17 POWDER, FOR SOLUTION ORAL at 17:20

## 2024-09-07 RX ADMIN — LEVETIRACETAM 500 MG: 500 TABLET, FILM COATED ORAL at 17:20

## 2024-09-07 RX ADMIN — MAGNESIUM HYDROXIDE 30 ML: 1200 LIQUID ORAL at 17:20

## 2024-09-07 RX ADMIN — GABAPENTIN 100 MG: 100 CAPSULE ORAL at 04:35

## 2024-09-07 RX ADMIN — GABAPENTIN 100 MG: 100 CAPSULE ORAL at 21:24

## 2024-09-07 RX ADMIN — DOCUSATE SODIUM 100 MG: 100 CAPSULE, LIQUID FILLED ORAL at 17:20

## 2024-09-07 RX ADMIN — METAXALONE 400 MG: 800 TABLET ORAL at 11:44

## 2024-09-07 RX ADMIN — OXYCODONE HYDROCHLORIDE 2.5 MG: 5 TABLET ORAL at 09:06

## 2024-09-07 RX ADMIN — METOPROLOL TARTRATE 25 MG: 25 TABLET, FILM COATED ORAL at 13:22

## 2024-09-07 RX ADMIN — GABAPENTIN 100 MG: 100 CAPSULE ORAL at 13:22

## 2024-09-07 RX ADMIN — METAXALONE 400 MG: 800 TABLET ORAL at 04:35

## 2024-09-07 RX ADMIN — LIDOCAINE 3 PATCH: 4 PATCH TOPICAL at 09:28

## 2024-09-07 RX ADMIN — METAXALONE 400 MG: 800 TABLET ORAL at 17:20

## 2024-09-07 RX ADMIN — ACETAMINOPHEN 650 MG: 325 TABLET ORAL at 17:23

## 2024-09-07 RX ADMIN — LEVETIRACETAM 500 MG: 500 TABLET, FILM COATED ORAL at 04:35

## 2024-09-07 ASSESSMENT — COGNITIVE AND FUNCTIONAL STATUS - GENERAL
MOBILITY SCORE: 24
SUGGESTED CMS G CODE MODIFIER MOBILITY: CH
DAILY ACTIVITIY SCORE: 24
SUGGESTED CMS G CODE MODIFIER DAILY ACTIVITY: CH

## 2024-09-07 ASSESSMENT — PAIN DESCRIPTION - PAIN TYPE
TYPE: ACUTE PAIN;CHRONIC PAIN
TYPE: ACUTE PAIN

## 2024-09-07 ASSESSMENT — GAIT ASSESSMENTS
DISTANCE (FEET): 200
DEVIATION: TRENDELENBERG
GAIT LEVEL OF ASSIST: STANDBY ASSIST

## 2024-09-07 ASSESSMENT — ENCOUNTER SYMPTOMS
WEAKNESS: 0
DIZZINESS: 0
CHILLS: 0
BACK PAIN: 0
PHOTOPHOBIA: 0
FOCAL WEAKNESS: 0
ROS GI COMMENTS: LAST BM BEFORE ADMIT
BLURRED VISION: 0
PALPITATIONS: 0
DOUBLE VISION: 0
MYALGIAS: 1
SHORTNESS OF BREATH: 0
NECK PAIN: 0
SENSORY CHANGE: 0
HEADACHES: 0
TINGLING: 0
FEVER: 0
SPEECH CHANGE: 0
GASTROINTESTINAL NEGATIVE: 1

## 2024-09-07 ASSESSMENT — ACTIVITIES OF DAILY LIVING (ADL): TOILETING: INDEPENDENT

## 2024-09-07 NOTE — FACE TO FACE
Face to Face Supporting Documentation - Home Health    The encounter with this patient was in whole or in part the primary reason for home health admission.    Date of encounter:   Patient:                    MRN:                       YOB: 2026  Joy Daniel  [id number]  1945     Home health to see patient for:  Skilled Nursing care for assessment, interventions & education, Physical Therapy evaluation and treatment, and Occupational therapy evaluation and treatment    Skilled need for:  Comment: nursing assessment    Skilled nursing interventions to include:  Comment: nursing assessment    Homebound status evidenced by:  Need the aid of supportive devices such as crutches, canes, wheelchairs or walkers or Needs the assistance of another person in order to leave the home. Leaving home requires a considerable and taxing effort. There is a normal inability to leave the home.    Community Physician to provide follow up care: Pcp Not In Computer     Optional Interventions? No      I certify the face to face encounter for this home health care referral meets the CMS requirements and the encounter/clinical assessment with the patient was, in whole, or in part, for the medical condition(s) listed above, which is the primary reason for home health care. Based on my clinical findings: the service(s) are medically necessary, support the need for home health care, and the homebound criteria are met.  I certify that this patient has had a face to face encounter by myself.  JORGE Polk - NPI: [id number]

## 2024-09-07 NOTE — CODE DOCUMENTATION
Primary RN speaking with APRN.   Orders given directly to primary RN.   OK to cancel RR.     Pt resting in bed even unlabored RR. VSS. No chest pain.

## 2024-09-07 NOTE — THERAPY
Physical Therapy   Initial Evaluation     Patient Name: Joy Daniel  Age:  78 y.o., Sex:  female  Medical Record #: [id number]  Today's Date: 9/7/2024     Precautions  Precautions: Fall Risk  Comments: bigeminy, assumed new no hx to compare; ED notes report hx of afib    Assessment  Pt presents with impaired higher level balance and cognition associated with chief complaint of GLF sustaining L frontal/parietal SAH, SDH. Pt found ambulating in hallway, this therapist weaned walker, positive trendelenberg but able to ambulate without device; performed community distance ambulation and stairs for home, no symptoms, denies vision changes, headache or light sensitivity; bigmeny  throughout, denies symptoms; son at bedside reporting pt will be staying with her daughter for a while; would benefit from home health PT in her own setting to set up environment for fall risk reduction; greatest concern for home safety would be higher level analytical tasks, therefore defer to OT/speech for cognitive concerns. Will follow.     Plan    Physical Therapy Initial Treatment Plan   Treatment Plan : Equipment, Bed Mobility, Gait Training, Manual Therapy, Neuro Re-Education / Balance, Self Care / Home Evaluation, Stair Training, Therapeutic Activities, Therapeutic Exercise  Treatment Frequency: 3 Times per Week  Duration: Until Therapy Goals Met    DC Equipment Recommendations: None (has SPC at home, reports she 'wouldn't use anyway' balance is currently sufficient without)  Discharge Recommendations: Recommend home health for continued physical therapy services (for home set up, if not available to her reports her dtr could assist with driving to outpatient for higher level balance training though pt may refuse)        Abridged Subjective/Objective     09/07/24 1105   Prior Living Situation   Prior Services None;Home-Independent   Housing / Facility 2 Story House   Steps Into Home 3   Steps In Home 16  (basement)    Bathroom Set up Bathtub / Shower Combination   Equipment Owned Single Point Cane   Lives with - Patient's Self Care Capacity Alone and Able to Care For Self   Comments going to stay with her daughter for a while; son at bedside to collaborate;   Prior Level of Functional Mobility   Bed Mobility Independent   Transfer Status Independent   Ambulation Independent   Ambulation Distance to tolerance   Assistive Devices Used None   Cognition    Cognition / Consciousness WDL   Level of Consciousness Alert   Comments pleasant and cooperative; functional during conversation however likely higher poor cognition; son at bedside, he lives in [facility];   Strength Lower Body   Lower Body Strength  WDL   Sensation Lower Body   Lower Extremity Sensation   WDL   Balance Assessment   Sitting Balance (Static) Good   Sitting Balance (Dynamic) Good   Standing Balance (Static) Good   Standing Balance (Dynamic) Good   Weight Shift Sitting Good   Weight Shift Standing Good   Comments found in hallway with CNA, took over   Bed Mobility    Supine to Sit Modified Independent   Sit to Supine Modified Independent   Scooting Modified Independent   Gait Analysis   Gait Level Of Assist Standby Assist   Assistive Device None   Distance (Feet) 200   # of Times Distance was Traveled 2   Deviation Trendelenberg   # of Stairs Climbed 20   Level of Assist with Stairs Modified Independent   Weight Bearing Status full   Vision Deficits Impacting Mobility wearing glasses   Comments distance limited by therapist; no symptoms throughout mobility; negative talk test, rate  throughout mobiilty   Functional Mobility   Sit to Stand Supervised   Bed, Chair, Wheelchair Transfer Supervised   Patient / Family Goals    Patient / Family Goal #1 to go home   Short Term Goals    Short Term Goal # 1 Pt will perform supine<>sit supervision within 6 visits to ensure independent mobility at home.   Education Group   Cardiac Precautions Patient Response  Patient;Acceptance;Explanation;Demonstration;Verbal Demonstration;Action Demonstration;Reinforcement Needed  ('talk test')   Role of Physical Therapist Patient Response Patient;Acceptance;Demonstration;Explanation;Verbal Demonstration;Action Demonstration   Use of Assistive Device Patient Response Acceptance;Patient;Explanation;Demonstration;Verbal Demonstration;Action Demonstration   Additional Comments protein/water in diet; valsalva reduction procedures and signs/symptoms of HA/worsening TBI/SDH

## 2024-09-07 NOTE — PROGRESS NOTES
Per monitor room vivienne Stark pt has been sustaining Bigeminy since 0640 and is in a  first degree. MD Cardozo and JADYN Brandt notified.

## 2024-09-07 NOTE — PROGRESS NOTES
Report called to [facility], discussed plan of care, orders and patient status, agree to admit. Patient to be transported to [facility] with telemetry monitoring

## 2024-09-07 NOTE — CARE PLAN
The patient is Watcher - Medium risk of patient condition declining or worsening    Shift Goals  Clinical Goals: stable neuro exam,mobility  Patient Goals: rest,sleep  Family Goals: updates, comfort    Progress made toward(s) clinical / shift goals:  c/o mild bilateral shoulder soreness,stable neuro & VS,ambulated to BR with fww,sba,on sinus rhythm on telemonitor  Problem: Knowledge Deficit - Standard  Goal: Patient and family/care givers will demonstrate understanding of plan of care, disease process/condition, diagnostic tests and medications  Outcome: Progressing     Problem: Pain - Standard  Goal: Alleviation of pain or a reduction in pain to the patient’s comfort goal  Outcome: Progressing       Patient is not progressing towards the following goals:

## 2024-09-07 NOTE — THERAPY
Occupational Therapy   Initial Evaluation     Patient Name: Joy Daniel  Age:  78 y.o., Sex:  female  Medical Record #: [id number]  Today's Date: 9/7/2024     Precautions  Precautions: Fall Risk  Comments: harinder per RN    Assessment  Patient is 78 y.o. female with a diagnosis of fall backwards, striking head with SDH and trace SAH, non-op management.  PMHx: a-fib, sleep apnea, chronic neck pain due to herniated disks. Additional factors influencing patient status / progress: Pt lives alone with limited social support. Pt demonstrates good safety with mild forgetfulness. Pt is able to do ADLS with supervision or less support but may benefit from home health to ensure home is set up to limit risk for falls. Pt with no other acute OT needs at this time but may receive 1-2 more sessions if needed for dc needs.      Plan    Occupational Therapy Initial Treatment Plan   Treatment Interventions: Other (comments) (none)  Treatment Frequency: Other (See Comments) (DC needs only)  Duration: Discharge Needs Only    DC Equipment Recommendations: Front-Wheel Walker, Tub / Shower Seat  Discharge Recommendations: Recommend home health for continued occupational therapy services        09/07/24 0857   Prior Living Situation   Prior Services None;Home-Independent   Housing / Facility 2 Story House   Steps Into Home 3   Steps In Home 16  (to/from basement which pt does need to use)   Bathroom Set up Bathtub / Shower Combination   Equipment Owned Single Point Cane   Lives with - Patient's Self Care Capacity Alone and Able to Care For Self   Comments limited social support   Prior Level of ADL Function   Self Feeding Independent   Grooming / Hygiene Independent   Bathing Independent   Dressing Independent   Toileting Independent   Prior Level of IADL Function   Medication Management Independent   Laundry Independent   Kitchen Mobility Independent   Finances Independent   Home Management Independent   Shopping Independent    Prior Level Of Mobility Independent Without Device in Community   Driving / Transportation Driving Independent   History of Falls   History of Falls Yes   Date of Last Fall   (Adm with fall backwards; no other falls noted per pt)   Precautions   Precautions Fall Risk   Comments harinder per RN   Pain   Pain Scales 0 to 10 Scale    Intervention Ambulation / Increased Activity   Pain 0 - 10 Group   Therapist Pain Assessment Post Activity Pain Same as Prior to Activity;Nurse Notified;0   Cognition    Cognition / Consciousness WDL   Level of Consciousness Alert   Comments Mild forgetfulness   Passive ROM Upper Body   Passive ROM Upper Body WDL   Active ROM Upper Body   Active ROM Upper Body  WDL   Strength Upper Body   Upper Body Strength  WDL   Sensation Upper Body   Upper Extremity Sensation  WDL   Upper Body Muscle Tone   Upper Body Muscle Tone  WDL   Neurological Concerns   Neurological Concerns No   Coordination Upper Body   Coordination WDL   Balance Assessment   Sitting Balance (Static) Good   Sitting Balance (Dynamic) Good   Standing Balance (Static) Fair +   Standing Balance (Dynamic) Fair +   Weight Shift Sitting Good   Weight Shift Standing Good   Comments with and without FWW   Bed Mobility    Supine to Sit Modified Independent   Sit to Supine Modified Independent   Scooting Modified Independent   Comments using rail, bed flat   ADL Assessment   Grooming Standing;Independent   Upper Body Dressing Independent   Lower Body Dressing Supervision   Toileting Supervision   Comments Pt does extreme bend at waist while standing for toilet hygiene so supervision provided for fall prevention   How much help from another person does the patient currently need...   Putting on and taking off regular lower body clothing? 4   Bathing (including washing, rinsing, and drying)? 4   Toileting, which includes using a toilet, bedpan, or urinal? 4   Putting on and taking off regular upper body clothing? 4   Taking care of  personal grooming such as brushing teeth? 4   Eating meals? 4   6 Clicks Daily Activity Score 24   mRS Prior to admission   Prior to admission mRS 0   Modified [place] (mRS)   Modified [place] Score 0   Functional Mobility   Sit to Stand Supervised   Bed, Chair, Wheelchair Transfer Supervised   Toilet Transfers Supervised   Transfer Method Stand Step   Comments with FWW and supervision due to admit from fall   Activity Tolerance   Sitting in Chair Toilet 4 min   Sitting Edge of Bed 3 min   Standing 5 min total   Education Group   Education Provided Role of Occupational Therapist   Role of Occupational Therapist Patient Response Patient;Acceptance;Explanation;Verbal Demonstration   Occupational Therapy Initial Treatment Plan    Treatment Interventions Other (comments)  (none)   Treatment Frequency Other (See Comments)  (DC needs only)   Duration Discharge Needs Only   Problem List   Problem List None   Anticipated Discharge Equipment and Recommendations   DC Equipment Recommendations Front-Wheel Walker;Tub / Shower Seat   Discharge Recommendations Recommend home health for continued occupational therapy services

## 2024-09-07 NOTE — PROGRESS NOTES
Trauma / Surgical Daily Progress Note    Date of Service  9/7/2024    Chief Complaint  78 y.o. female admitted 9/6/2024 with subdural hematoma and subarachnoid hemorrhages after sustaining a mechanical fall.    Interval Events  Transferred from TICU to neuro nagy yesterday.   Cardiology reviewed prior EKGs and cardiac monitoring & did appreciate afib.  Currently in sinus rhythm on cardiac monitor.   Home atenolol changed to Metoprolol by geriatric medicine.  Echocardiogram with LVEF 50%    - Continue remote telemetry  - Geriatric medicine follow up for cardiac recs  - Mobilize with PT & OT for final discharge recs  - SLP cognitive evaluation pending  - Disposition: pending therapy recs. Patient lives in [place] at home independently. She can stay with her daughter for a few days for family support as needed.    Review of Systems  Review of Systems   Constitutional:  Positive for malaise/fatigue. Negative for chills and fever.   HENT: Negative.     Eyes:  Negative for blurred vision, double vision and photophobia.   Respiratory:  Negative for shortness of breath.    Cardiovascular:  Negative for chest pain and palpitations.   Gastrointestinal: Negative.         Last BM before admit   Genitourinary: Negative.         Voiding   Musculoskeletal:  Positive for myalgias. Negative for back pain, joint pain and neck pain.   Skin: Negative.    Neurological:  Negative for dizziness, tingling, sensory change, speech change, focal weakness, weakness and headaches.      Vital Signs  Temp:  [36.1 °C (97 °F)-36.5 °C (97.7 °F)] 36.2 °C (97.2 °F)  Pulse:  [] 60  Resp:  [10-25] 18  BP: (123-165)/(62-77) 127/68  SpO2:  [91 %-98 %] 93 %    Physical Exam  Physical Exam  Vitals reviewed.   Constitutional:       General: She is sleeping. She is not in acute distress.     Appearance: She is not ill-appearing.   HENT:      Head: Normocephalic and atraumatic.      Right Ear: External ear normal.      Left Ear: External ear normal.       Nose: Nose normal.      Mouth/Throat:      Mouth: Mucous membranes are moist.      Pharynx: Oropharynx is clear.   Eyes:      Extraocular Movements: Extraocular movements intact.      Pupils: Pupils are equal, round, and reactive to light.   Cardiovascular:      Rate and Rhythm: Normal rate and regular rhythm.      Heart sounds: Normal heart sounds.   Pulmonary:      Effort: Pulmonary effort is normal. No respiratory distress.      Breath sounds: Normal breath sounds.   Abdominal:      General: There is no distension.      Palpations: Abdomen is soft.      Tenderness: There is no abdominal tenderness. There is no guarding.   Musculoskeletal:      Cervical back: Normal range of motion and neck supple. No tenderness.      Right lower leg: No edema.      Left lower leg: No edema.   Skin:     General: Skin is warm and dry.      Capillary Refill: Capillary refill takes less than 2 seconds.   Neurological:      General: No focal deficit present.      Mental Status: She is oriented to person, place, and time and easily aroused.      GCS: GCS eye subscore is 4. GCS verbal subscore is 5. GCS motor subscore is 6.      Sensory: Sensation is intact. No sensory deficit.      Motor: No weakness or seizure activity.      Comments: 5/5 strength bilateral upper & lower extremities, no drifts appreciated.   Psychiatric:         Behavior: Behavior is cooperative.       Laboratory  Recent Results (from the past 24 hour(s))   EC-ECHOCARDIOGRAM COMPLETE W/O CONT    Collection Time: 09/06/24  1:15 PM   Result Value Ref Range    Eject.Frac. MOD BP 46.68     Eject.Frac. MOD 4C 43.81     Eject.Frac. MOD 2C 49.64     Left Ventrical Ejection Fraction 50    URINE SODIUM RANDOM    Collection Time: 09/06/24  4:38 PM   Result Value Ref Range    Sodium, Urine -per volume 122 mmol/L   EKG    Collection Time: 09/06/24  6:29 PM   Result Value Ref Range    Report       [facility]    Test Date:  2024-09-06  Pt Name:    PIPER YOUNG                Department: [facility]  MRN:        [id number]                      Room:       S1  Gender:     Female                       Technician: MELISSA  :        [id number]                   Requested By:DISHA PULIDO  Order #:    [id number]                    Reading MD: Quita Trivedi    Measurements  Intervals                                Axis  Rate:       88                           P:          70  CO:         194                          QRS:        10  QRSD:       93                           T:          57  QT:         349  QTc:        423    Interpretive Statements  Sinus rhythm  Premature ventricular beats some in pattern of bigeminy  Probable left atrial enlargement  Compared to ECG 2024 01:46:51  similar in appearance  Electronically Signed On 2024 22:00:04 PDT by Quita Trivedi     CBC with Differential: Tomorrow AM    Collection Time: 24  3:36 AM   Result Value Ref Range    WBC 5.7 4.8 - 10.8 K/uL    RBC 4.18 (L) 4.20 - 5.40 M/uL    Hemoglobin 13.0 12.0 - 16.0 g/dL    Hematocrit 39.5 37.0 - 47.0 %    MCV 94.5 81.4 - 97.8 fL    MCH 31.1 27.0 - 33.0 pg    MCHC 32.9 32.2 - 35.5 g/dL    RDW 43.8 35.9 - 50.0 fL    Platelet Count 166 164 - 446 K/uL    MPV 9.4 9.0 - 12.9 fL    Neutrophils-Polys 49.60 44.00 - 72.00 %    Lymphocytes 36.00 22.00 - 41.00 %    Monocytes 7.20 0.00 - 13.40 %    Eosinophils 6.30 0.00 - 6.90 %    Basophils 0.70 0.00 - 1.80 %    Immature Granulocytes 0.20 0.00 - 0.90 %    Nucleated RBC 0.00 0.00 - 0.20 /100 WBC    Neutrophils (Absolute) 2.81 1.82 - 7.42 K/uL    Lymphs (Absolute) 2.04 1.00 - 4.80 K/uL    Monos (Absolute) 0.41 0.00 - 0.85 K/uL    Eos (Absolute) 0.36 0.00 - 0.51 K/uL    Baso (Absolute) 0.04 0.00 - 0.12 K/uL    Immature Granulocytes (abs) 0.01 0.00 - 0.11 K/uL    NRBC (Absolute) 0.00 K/uL   Comp Metabolic Panel (CMP): Tomorrow AM    Collection Time: 24  3:36 AM   Result Value Ref Range    Sodium 136 135 - 145 mmol/L    Potassium 4.3 3.6 - 5.5 mmol/L    Chloride 104  96 - 112 mmol/L    Co2 23 20 - 33 mmol/L    Anion Gap 9.0 7.0 - 16.0    Glucose 99 65 - 99 mg/dL    Bun 9 8 - 22 mg/dL    Creatinine 0.42 (L) 0.50 - 1.40 mg/dL    Calcium 8.5 8.5 - 10.5 mg/dL    Correct Calcium 8.9 8.5 - 10.5 mg/dL    AST(SGOT) 16 12 - 45 U/L    ALT(SGPT) 36 2 - 50 U/L    Alkaline Phosphatase 61 30 - 99 U/L    Total Bilirubin 0.7 0.1 - 1.5 mg/dL    Albumin 3.5 3.2 - 4.9 g/dL    Total Protein 5.9 (L) 6.0 - 8.2 g/dL    Globulin 2.4 1.9 - 3.5 g/dL    A-G Ratio 1.5 g/dL   ESTIMATED GFR    Collection Time: 09/07/24  3:36 AM   Result Value Ref Range    GFR (CKD-EPI) 98 >60 mL/min/1.73 m 2       Fluids    Intake/Output Summary (Last 24 hours) at 9/7/2024 1023  Last data filed at 9/6/2024 1800  Gross per 24 hour   Intake 400 ml   Output --   Net 400 ml       Core Measures & Quality Metrics  Labs reviewed, Radiology images reviewed and Medications reviewed  Leo catheter: No Leo      DVT Prophylaxis: Contraindicated - High bleeding risk  DVT prophylaxis - mechanical: SCDs  Ulcer prophylaxis: Not indicated    Assessed for rehab: Patient returned to prior level of function, rehabilitation not indicated at this time    RAP Score Total: 7    CAGE Results: not completed Blood Alcohol>0.08: no     Assessment/Plan  * Trauma- (present on admission)  Assessment & Plan  Mechanical fall.  Trauma Yellow Transfer Activation from [facility] in [place], [place].  Silas Cartagena MD. Trauma Surgery.    Atrial fibrillation (HCC)- (present on admission)  Assessment & Plan  EKG on arrival with atrial fibrillation with rate 130s - 140s.  9/6 Cardiology consulted, no atrial fibrillation by interpretation on admit EKG. Repeat EKG with sinus rhythm.   Echocardiogram with LVEF 50%.  Remote telemetry.    Traumatic intracranial hemorrhage without loss of consciousness (HCC)- (present on admission)  Assessment & Plan  Repeat head CT on arrival with stable left parafalcine subdural hematoma  Left frontal  and parietal medial subarachnoid hemorrhages  Right medial subarachnoid hemorrhages, stable.  Non-operative management.  Post traumatic pharmacologic seizure prophylaxis for 1 week.  Speech Language Pathology cognitive evaluation.  Morro Lucas MD. Neurosurgeon. [facility].    Encounter for geriatric assessment- (present on admission)  Assessment & Plan  9/6 The patient is 75 years old or older and a geriatrics consult is indicated  Sean Cardozo MD, Geriatric Hospitalist.    Hypertension- (present on admission)  Assessment & Plan  Chronic condition treated with atenolol.  Held per cardiology recommendation. Initiated metoprolol.    Contraindication to deep vein thrombosis (DVT) prophylaxis- (present on admission)  Assessment & Plan  VTE prophylaxis initially contraindicated secondary to elevated bleeding risk.  9/8 Trauma surveillance venous duplex ultrasonography ordered.    Aspirin long-term use- (present on admission)  Assessment & Plan  Takes aspirin 81 mg.  TEG with platelet mapping with AA inhibition 90%.  Repeat head CT and neuro checks stable. GCS 15.   Platelet transfusion deferred.  Hold ASA for 6 weeks post injury per neurosurgery.    Chronic neck and back pain- (present on admission)  Assessment & Plan  History of herniated disks.      Discussed patient condition with RN, Charge nurse / hot rounds, Patient, and trauma surgery, Dr. Byrd.

## 2024-09-07 NOTE — PROGRESS NOTES
Monitor Summary: SB/SR/ST , TX 0.18, QRS 0.08, QT 0.35, with BBB and rare PVCs per strip from monitor room.

## 2024-09-07 NOTE — DISCHARGE SUMMARY
Trauma Discharge Summary    DATE OF ADMISSION: 9/6/2024    DATE OF DISCHARGE: 9/8/2024    LENGTH OF STAY: 2 days    ATTENDING PHYSICIAN: Silas Byrd M.D.    CONSULTING PHYSICIAN:   1. Dr. Sean Cardozo MD, Geriatric Medicine  2. Dr. Morro Lucas MD, Neurosurgery  3. Lizet SALAMANCA, Cardiology    DISCHARGE DIAGNOSIS:  Principal Problem:    Trauma  Active Problems:    Traumatic intracranial hemorrhage without loss of consciousness (HCC)    Arrhythmia    Aspirin long-term use    Contraindication to deep vein thrombosis (DVT) prophylaxis    Hypertension    Encounter for geriatric assessment    Fall (Chronic)    Atrial fibrillation with RVR (HCC)    Leukocytosis    Chronic neck and back pain  Resolved Problems:    Hyponatremia    PROCEDURES: None    HISTORY OF PRESENT ILLNESS: The patient is a 78 y.o. female who was reportedly injured after sustaining a mechanical fall. She was initially evaluated at [facility] in [facility]. She was found to have a left subdural hematoma and left subarachnoid hemorrhages. She was transferred to [facility] in [place], [place] for further trauma work up and evaluation.    HOSPITAL COURSE: The patient was triaged as a partial transfer activation. Referring facility imaging was consistent with a subdural hematoma and subarachnoid hemorrhages.The patient was transported to the trauma intensive care unit where she was monitored with serial neurological exams and received Keppra for seizure prophylaxis. A repeat head CT was obtained and showed stable hemorrhages. Neurosurgery was consulted and recommended non operative management. An EKG on admission showed atrial fibrillation. The patient was monitored with continuous cardiac monitoring. Cardiology was called and reviewed the patient's EKGs and cardiac monitoring. No atrial fibrillation was appreciated by cardiology. Additionally, the patient was noted to have one episode of a  Wenckebach block. Cardiology recommended stopping the patient's home beta blocker and no need for additional medications or antiarrhythmics. Cardiology also recommended no need for an outpatient Zio patch and to follow up outpatient. During her hospitalization, the patient was evaluated by therapies. The multi disciplinary team recommended discharge home with home health. Home health referrals were placed and pending prior to discharge. The patient elected to discharge home with home health pending and she will stay with her daughter until home health is confirmed.    On day of discharge, the patient was tolerating room air and a regular diet. She was neurologically intact with a Waldo Coma Score of 15. She had no chest pain, shortness of breath, dizziness, lightheaded, or palpitations. She understands following up with outpatient cardiology, her primary care provider, and neurosurgery. She was discharged home with home health pending and outpatient follow up as discussed below.    HOSPITAL PROBLEM LIST:  * Trauma- (present on admission)  Assessment & Plan  Mechanical fall.  Trauma Yellow Transfer Activation from [facility] in [place], [place].  Silas Cartagena MD. Trauma Surgery.    Arrhythmia- (present on admission)  Assessment & Plan  EKG on arrival with atrial fibrillation with rate 130s - 140s.  9/6 Cardiology consulted, no atrial fibrillation by interpretation on admit EKG. Repeat EKG with sinus rhythm. Recs changing atenolol to metoprolol.  Echocardiogram with LVEF 50%.  9/7 Possible 1 second degree heart block on cardiac monitoring.   9/8 Formal Cardiology consult recs stopping beta blocker therapy & no need for Zio patch. Outpatient cardiology referral placed.  Remote telemetry.    Traumatic intracranial hemorrhage without loss of consciousness (HCC)- (present on admission)  Assessment & Plan  Repeat head CT on arrival with stable left parafalcine subdural hematoma  Left frontal  and parietal medial subarachnoid hemorrhages  Right medial subarachnoid hemorrhages, stable.  Non-operative management.  Post traumatic pharmacologic seizure prophylaxis for 1 week.  Speech Language Pathology cognitive evaluation.  Morro Lucas MD. Neurosurgeon. [facility].    Encounter for geriatric assessment- (present on admission)  Assessment & Plan  9/6 The patient is 75 years old or older and a geriatrics consult is indicated  Sean Cardozo MD, Geriatric Hospitalist.    Hypertension- (present on admission)  Assessment & Plan  Chronic condition treated with atenolol.  Held per cardiology recommendation.    Contraindication to deep vein thrombosis (DVT) prophylaxis- (present on admission)  Assessment & Plan  VTE prophylaxis initially contraindicated secondary to elevated bleeding risk.  9/8 Trauma surveillance venous duplex ultrasonography ordered.    Aspirin long-term use- (present on admission)  Assessment & Plan  Takes aspirin 81 mg PRN for headaches.  TEG with platelet mapping with AA inhibition 90%.  Repeat head CT and neuro checks stable. GCS 15.   Platelet transfusion deferred.  Hold ASA for 6 weeks post injury per neurosurgery.    Hyponatremia-resolved as of 9/7/2024, (present on admission)  Assessment & Plan  Admission sodium 132. Could be baseline.  Monitor.    Chronic neck and back pain- (present on admission)  Assessment & Plan  History of herniated disks.        DISPOSITION: Discharged home with home health pending on 9/8/24. The patient was  counseled and questions were answered. Specifically, signs and symptoms of infection, respiratory decompensation, neurological changes, chest pain, shortness of breath, dizziness, lightheadedness, palpitations, and persistent or worsening pain were discussed and the patient agrees to seek medical attention if any of these develop.    DISCHARGE MEDICATIONS:  The patients controlled substance history was reviewed and a controlled substance use  informed consent (if applicable) was provided by [facility] and the patient has been prescribed.     Medication List        START taking these medications        Instructions   levETIRAcetam 500 MG Tabs  Commonly known as: Keppra   Take 1 Tablet by mouth every 12 hours for 4 days.  Dose: 500 mg            CONTINUE taking these medications        Instructions   acetaminophen 500 MG Tabs  Commonly known as: Tylenol   Take 500-1,000 mg by mouth 1 time a day as needed for Mild Pain.  Dose: 500-1,000 mg     ascorbic acid 500 MG tablet  Commonly known as: Vitamin C   Take 500 mg by mouth every day.  Dose: 500 mg     Calcium 600 MG Tabs   Take 600 mg by mouth every day.  Dose: 600 mg     fish oil 1000 MG Caps capsule   Take 1,000 mg by mouth 2 times a day.  Dose: 1,000 mg     magnesium oxide 400 MG Tabs tablet  Commonly known as: Mag-Ox   Take 400 mg by mouth every day.  Dose: 400 mg     therapeutic multivitamin-minerals Tabs   Take 1 Tablet by mouth every day.  Dose: 1 Tablet     VITAMIN B COMPLEX PO   Take 1 Tablet by mouth every day.  Dose: 1 Tablet            STOP taking these medications      aspirin 325 MG Tabs  Commonly known as: Asa     atenolol 50 MG Tabs  Commonly known as: Tenormin     clonazePAM 0.5 MG Tabs  Commonly known as: KlonoPIN              ACTIVITY: as tolerated    DIET:  Orders Placed This Encounter   Procedures    Diet Order Diet: Cardiac     Standing Status:   Standing     Number of Occurrences:   1     Order Specific Question:   Diet:     Answer:   Cardiac [6]       FOLLOW UP:  Morro Lucas M.D.  [address]  [address]  [facility] [id number] [phone]    Follow up  Follow up in 3 weeks for repeat Head CT.    [facility]  [phone]  Follow up  Follow up with [facility] as soon as possible. An outpatient referral was placed prior to discharge.    Established Primary Care Provider    Follow up  Follow up with your PCP as soon as possible to discuss  your recent hospitalization.      TIME SPENT ON DISCHARGE: 34 minutes      ____________________________________________  JORGE Polk

## 2024-09-07 NOTE — THERAPY
Speech Language Pathology   Cognitive Evaluation     Patient Name: Joy Daniel  AGE:  78 y.o., SEX:  female  Medical Record #: [id number]  Date of Service: 9/7/2024      History of Present Illness  78 y.o. female who presented from OSH on 9/6 after GLF. Found to have trace traumatic subarachnoid hemorrhage, intraparenchymal hemorrhage and subdural hematoma in the interhemispheric fissure.    PMHx: HTN    CT-Head:  1.  Stable left parafalcine subdural hematoma  2.  Left frontal and parietal medial subarachnoid hemorrhages  3.  Right medial subarachnoid hemorrhages, stable  4.  Nonspecific white matter changes, commonly associated with small vessel ischemic disease.  Associated mild cerebral atrophy is noted.  5.  Atherosclerosis.      General Information  Vitals  O2 Delivery Device: None - Room Air  Level of Consciousness: Alert, Awake  Orientation: Oriented x 4  Follows Directives: Yes      Prior Living Situation & Level of Function  Prior Services: None, Home-Independent  Housing / Facility: [address]  Lives with - Patient's Self Care Capacity: Alone and Able to Care For Self  Communication: WFL  Swallowing: WFL        Subjective  Patient received awake. Agreeable to SLP evaluation.         Assessment  The patient was seen this date for a cognitive-linguistic evaluation. Portions of the COGNISTAT (The Neurobehavioral Cognitive Status Examination), as well as non-standardized assessments were utilized. Results are as follows:      Cognistat  Orientation: Average  Attention: Average  Comprehension: Average  Repetition: Average  Naming: Average  Memory: Average  Calculations: Mild  Similarities: Average  Judgement: Average    Medication Management  Patient reported taking daily medications. Pt read and answered questions regarding medication management with 100% accuracy. Provided a functional memory-enhancing strategy for taking medications.    Comments:   Patient participated in conversation with fluent,  intelligible speech. Attended to all structured tasks. Timely response time throughout assessment. Calculations: pt answered 2/4 questions correctly; endorsed consistent with baseline.          Clinical Impressions  Per results of formal and informal cognitive evaluations, the patient presents with cognition consistent with or approaching their baseline level of functioning. Therefore, no further acute speech pathology services are indicated at this time.  Discussed with patient that, should higher level deficits impact the patient's ability to resume premorbid activities, recommend outpatient speech therapy follow up.       NOTE: It is not within the scope of practice of Speech-Language Pathologists to determine patient capacity. Please defer to the physician or psych to complete this assessment.       Recommendations  Supervision Needs Upons Discharge: None  IADLs: N/A         SLP Treatment Plan  Treatment Plan: None Indicated  SLP Frequency: N/A - Evaluation Only  Estimated Duration: N/A - Evaluation Only      Anticipated Discharge Needs  Discharge Recommendations: Anticipate that the patient will have no further speech therapy needs after discharge from the hospital  Therapy Recommendations Upon DC: Not Indicated                Jim Vela, SLP

## 2024-09-07 NOTE — PROGRESS NOTES
4 Eyes Skin Assessment Completed by MATTHIEU Layne and MATTHIEU Burris.    Head WDL  Ears WDL  Nose WDL  Mouth WDL  Neck WDL  Breast/Chest WDL  Shoulder Blades WDL  Spine WDL  (R) Arm/Elbow/Hand WDL  (L) Arm/Elbow/Hand WDL  Abdomen WDL  Groin WDL  Scrotum/Coccyx/Buttocks WDL  (R) Leg WDL  (L) Leg WDL  (R) Heel/Foot/Toe WDL  (L) Heel/Foot/Toe WDL          Devices In Places Tele Box and Blood Pressure Cuff      Interventions In Place Pillows and Pressure Redistribution Mattress    Possible Skin Injury No    Pictures Uploaded Into Epic N/A  Wound Consult Placed N/A  RN Wound Prevention Protocol Ordered No

## 2024-09-08 ENCOUNTER — APPOINTMENT (OUTPATIENT)
Dept: RADIOLOGY | Facility: MEDICAL CENTER | Age: 79
DRG: 086 | End: 2024-09-08
Attending: NURSE PRACTITIONER
Payer: MEDICARE

## 2024-09-08 ENCOUNTER — PHARMACY VISIT (OUTPATIENT)
Dept: PHARMACY | Facility: MEDICAL CENTER | Age: 79
End: 2024-09-08
Payer: COMMERCIAL

## 2024-09-08 VITALS
BODY MASS INDEX: 27.43 KG/M2 | DIASTOLIC BLOOD PRESSURE: 65 MMHG | RESPIRATION RATE: 18 BRPM | SYSTOLIC BLOOD PRESSURE: 126 MMHG | OXYGEN SATURATION: 94 % | WEIGHT: 149.03 LBS | HEART RATE: 76 BPM | HEIGHT: 62 IN | TEMPERATURE: 97.4 F

## 2024-09-08 PROBLEM — I49.9 ARRHYTHMIA: Status: ACTIVE | Noted: 2024-09-06

## 2024-09-08 LAB
ANION GAP SERPL CALC-SCNC: 11 MMOL/L (ref 7–16)
BASOPHILS # BLD AUTO: 0.5 % (ref 0–1.8)
BASOPHILS # BLD: 0.03 K/UL (ref 0–0.12)
BUN SERPL-MCNC: 14 MG/DL (ref 8–22)
CALCIUM SERPL-MCNC: 9.1 MG/DL (ref 8.5–10.5)
CHLORIDE SERPL-SCNC: 104 MMOL/L (ref 96–112)
CO2 SERPL-SCNC: 21 MMOL/L (ref 20–33)
CREAT SERPL-MCNC: 0.54 MG/DL (ref 0.5–1.4)
EOSINOPHIL # BLD AUTO: 0.34 K/UL (ref 0–0.51)
EOSINOPHIL NFR BLD: 5.1 % (ref 0–6.9)
ERYTHROCYTE [DISTWIDTH] IN BLOOD BY AUTOMATED COUNT: 43 FL (ref 35.9–50)
GFR SERPLBLD CREATININE-BSD FMLA CKD-EPI: 92 ML/MIN/1.73 M 2
GLUCOSE SERPL-MCNC: 96 MG/DL (ref 65–99)
HCT VFR BLD AUTO: 39.7 % (ref 37–47)
HGB BLD-MCNC: 13.4 G/DL (ref 12–16)
IMM GRANULOCYTES # BLD AUTO: 0.02 K/UL (ref 0–0.11)
IMM GRANULOCYTES NFR BLD AUTO: 0.3 % (ref 0–0.9)
LYMPHOCYTES # BLD AUTO: 2.27 K/UL (ref 1–4.8)
LYMPHOCYTES NFR BLD: 34.3 % (ref 22–41)
MCH RBC QN AUTO: 31.2 PG (ref 27–33)
MCHC RBC AUTO-ENTMCNC: 33.8 G/DL (ref 32.2–35.5)
MCV RBC AUTO: 92.3 FL (ref 81.4–97.8)
MONOCYTES # BLD AUTO: 0.53 K/UL (ref 0–0.85)
MONOCYTES NFR BLD AUTO: 8 % (ref 0–13.4)
NEUTROPHILS # BLD AUTO: 3.43 K/UL (ref 1.82–7.42)
NEUTROPHILS NFR BLD: 51.8 % (ref 44–72)
NRBC # BLD AUTO: 0 K/UL
NRBC BLD-RTO: 0 /100 WBC (ref 0–0.2)
PLATELET # BLD AUTO: 177 K/UL (ref 164–446)
PMV BLD AUTO: 9.2 FL (ref 9–12.9)
POTASSIUM SERPL-SCNC: 4.4 MMOL/L (ref 3.6–5.5)
RBC # BLD AUTO: 4.3 M/UL (ref 4.2–5.4)
SODIUM SERPL-SCNC: 136 MMOL/L (ref 135–145)
WBC # BLD AUTO: 6.6 K/UL (ref 4.8–10.8)

## 2024-09-08 PROCEDURE — 85025 COMPLETE CBC W/AUTO DIFF WBC: CPT

## 2024-09-08 PROCEDURE — RXMED WILLOW AMBULATORY MEDICATION CHARGE

## 2024-09-08 PROCEDURE — 93970 EXTREMITY STUDY: CPT | Mod: 26 | Performed by: INTERNAL MEDICINE

## 2024-09-08 PROCEDURE — 700102 HCHG RX REV CODE 250 W/ 637 OVERRIDE(OP)

## 2024-09-08 PROCEDURE — 93970 EXTREMITY STUDY: CPT

## 2024-09-08 PROCEDURE — 700101 HCHG RX REV CODE 250

## 2024-09-08 PROCEDURE — A9270 NON-COVERED ITEM OR SERVICE: HCPCS | Performed by: NURSE PRACTITIONER

## 2024-09-08 PROCEDURE — A9270 NON-COVERED ITEM OR SERVICE: HCPCS

## 2024-09-08 PROCEDURE — 36415 COLL VENOUS BLD VENIPUNCTURE: CPT

## 2024-09-08 PROCEDURE — 99239 HOSP IP/OBS DSCHRG MGMT >30: CPT

## 2024-09-08 PROCEDURE — 80048 BASIC METABOLIC PNL TOTAL CA: CPT

## 2024-09-08 PROCEDURE — 99222 1ST HOSP IP/OBS MODERATE 55: CPT | Mod: FS | Performed by: INTERNAL MEDICINE

## 2024-09-08 PROCEDURE — 700102 HCHG RX REV CODE 250 W/ 637 OVERRIDE(OP): Performed by: NURSE PRACTITIONER

## 2024-09-08 RX ORDER — LEVETIRACETAM 500 MG/1
500 TABLET ORAL EVERY 12 HOURS
Qty: 8 TABLET | Refills: 0 | Status: SHIPPED | OUTPATIENT
Start: 2024-09-08 | End: 2024-09-12

## 2024-09-08 RX ADMIN — METAXALONE 400 MG: 800 TABLET ORAL at 04:53

## 2024-09-08 RX ADMIN — METAXALONE 400 MG: 800 TABLET ORAL at 13:28

## 2024-09-08 RX ADMIN — GABAPENTIN 100 MG: 100 CAPSULE ORAL at 13:28

## 2024-09-08 RX ADMIN — ACETAMINOPHEN 650 MG: 325 TABLET ORAL at 13:28

## 2024-09-08 RX ADMIN — METOPROLOL TARTRATE 25 MG: 25 TABLET, FILM COATED ORAL at 04:53

## 2024-09-08 RX ADMIN — GABAPENTIN 100 MG: 100 CAPSULE ORAL at 04:53

## 2024-09-08 RX ADMIN — LEVETIRACETAM 500 MG: 500 TABLET, FILM COATED ORAL at 04:53

## 2024-09-08 RX ADMIN — LIDOCAINE 2 PATCH: 4 PATCH TOPICAL at 08:53

## 2024-09-08 RX ADMIN — ACETAMINOPHEN 650 MG: 325 TABLET ORAL at 04:53

## 2024-09-08 ASSESSMENT — PAIN DESCRIPTION - PAIN TYPE: TYPE: ACUTE PAIN

## 2024-09-08 ASSESSMENT — ENCOUNTER SYMPTOMS
FOCAL WEAKNESS: 0
HEADACHES: 0
BLURRED VISION: 0
WHEEZING: 0
ABDOMINAL DISTENTION: 0
LIGHT-HEADEDNESS: 0
HEADACHES: 1
FATIGUE: 1
SENSORY CHANGE: 0
CONSTITUTIONAL NEGATIVE: 1
NECK PAIN: 1
SPEECH CHANGE: 0
DOUBLE VISION: 0
SHORTNESS OF BREATH: 0
MUSCULOSKELETAL NEGATIVE: 1
PSYCHIATRIC NEGATIVE: 1
TINGLING: 0
FACIAL ASYMMETRY: 0
ABDOMINAL PAIN: 0
DIZZINESS: 0
PALPITATIONS: 0
PHOTOPHOBIA: 0
CHEST TIGHTNESS: 0
WEAKNESS: 0
ROS GI COMMENTS: LAST BM 9/7
GASTROINTESTINAL NEGATIVE: 1

## 2024-09-08 ASSESSMENT — FIBROSIS 4 INDEX: FIB4 SCORE: 1.29

## 2024-09-08 NOTE — CARE PLAN
The patient is Stable - Low risk of patient condition declining or worsening    Shift Goals  Clinical Goals: Safety/mobility  Patient Goals: rest,sleep  Family Goals: updates, comfort    Progress made toward(s) clinical / shift goals:    Problem: Fall Risk  Goal: Patient will remain free from falls  Outcome: Progressing   Fall precautions in place. Call light, bedside table, and pt belongings within reach. Pt understands the use of call light prior to getting out of bed.    Problem: Pain - Standard  Goal: Alleviation of pain or a reduction in pain to the patient’s comfort goal  Outcome: Progressing    0-10 pain scale in place. Pt encouraged to notify this RN if pain medication is needed.     Patient is not progressing towards the following goals: N/A

## 2024-09-08 NOTE — PROGRESS NOTES
Monitor summary: SR/ST , AR -0.16, QRS -0.09, QT -0.35, with (F) PVCs, PACs and bigeminal PVCs per strip from the monitor room.

## 2024-09-08 NOTE — PROGRESS NOTES
Monitor Summary: SR/ST , MD 0.21, QRS 0.08, QT 0.33, with rare couplets, bigeminy, trigeminy, rare and frequent PVC's/PAC's per strip from monitor room.

## 2024-09-08 NOTE — DISCHARGE INSTRUCTIONS
- Call or seek medical attention for questions or concerns  - Follow up with the [facility] RETURN: PRN  - Follow up with your primary care provider as soon as possible  - An outpatient cardiology referral was placed, make an appointment with Cardiology as soon as possible  - Follow up with neurosurgeon, Dr. Morro Lucas in 3 weeks with a repeat head CT  - Avoid all blood thinners including aspirin or NSAIDs (ibuprophen, Advil, Aleve, Motrin)  - Follow up with primary care provider within one weeks time  - Resume regular diet  - May take over the counter acetaminophen as needed for pain  - If respiratory decompensation, persistent or worsening pain, neurological changes, chest pain, shortness of breath, dizziness, lightheadedness, or signs or symptoms of infection occur seek medical attention

## 2024-09-08 NOTE — DISCHARGE PLANNING
note:  Discussed with charge nurse, pt was discharged. APRN was aware that home health was still pending and was agreeable to dc per charge nurse.     was not notified of discharge.

## 2024-09-08 NOTE — PROGRESS NOTES
"    Trauma / Surgical Daily Progress Note    Date of Service  9/8/2024    Chief Complaint  78 y.o. female admitted 9/6/2024 with subdural hematoma and subarachnoid hemorrhages after sustaining a mechanical fall.    Interval Events  Sinus tachycardia with PVCs on cardiac monitor this AM  Possible \"1 second degree heart block\" noted yesterday evening.   Case discussed with Cardiology this AM who will formally consult.    - Continue remote telemetry  - Formal cardiology consult pending  - Disposition: Home health ordered & pending. Plan to discharge home once cleared by cardiology and home health approved.     Review of Systems  Review of Systems   Constitutional: Negative.    HENT: Negative.     Eyes:  Negative for blurred vision, double vision and photophobia.   Respiratory:  Negative for shortness of breath.    Cardiovascular:  Negative for chest pain and palpitations.   Gastrointestinal: Negative.         Last BM 9/7   Genitourinary: Negative.         Voiding   Musculoskeletal: Negative.    Skin: Negative.    Neurological:  Negative for dizziness, tingling, sensory change, speech change, focal weakness, weakness and headaches.      Vital Signs  Temp:  [36 °C (96.8 °F)-36.4 °C (97.6 °F)] 36 °C (96.8 °F)  Pulse:  [43-80] 66  Resp:  [18-20] 18  BP: (109-141)/(62-85) 141/82  SpO2:  [89 %-96 %] 94 %    Physical Exam  Physical Exam  Vitals reviewed.   Constitutional:       General: She is sleeping. She is not in acute distress.     Appearance: She is not ill-appearing.   HENT:      Head: Normocephalic and atraumatic.      Right Ear: External ear normal.      Left Ear: External ear normal.      Nose: Nose normal.      Mouth/Throat:      Mouth: Mucous membranes are moist.      Pharynx: Oropharynx is clear.   Eyes:      Extraocular Movements: Extraocular movements intact.      Pupils: Pupils are equal, round, and reactive to light.   Cardiovascular:      Rate and Rhythm: Normal rate and regular rhythm.      Heart sounds: " Normal heart sounds.   Pulmonary:      Effort: Pulmonary effort is normal. No respiratory distress.      Breath sounds: Normal breath sounds.   Abdominal:      General: There is no distension.      Palpations: Abdomen is soft.      Tenderness: There is no abdominal tenderness. There is no guarding.   Musculoskeletal:      Cervical back: Normal range of motion and neck supple. No tenderness.      Right lower leg: No edema.      Left lower leg: No edema.   Skin:     General: Skin is warm and dry.      Capillary Refill: Capillary refill takes less than 2 seconds.   Neurological:      General: No focal deficit present.      Mental Status: She is oriented to person, place, and time and easily aroused.      GCS: GCS eye subscore is 4. GCS verbal subscore is 5. GCS motor subscore is 6.      Sensory: Sensation is intact. No sensory deficit.      Motor: No weakness or seizure activity.      Comments: 5/5 strength bilateral upper & lower extremities, no drifts appreciated.   Psychiatric:         Behavior: Behavior is cooperative.       Laboratory  Recent Results (from the past 24 hour(s))   EKG    Collection Time: 24 12:35 PM   Result Value Ref Range    Report       [facility]    Test Date:  2024  Pt Name:    PIPER YOUNG               Department: [facility]  MRN:        [id number]                      Room:       [facility]  Gender:     Female                       Technician: [facility]  :        [id number]                   Requested By:SANTOSH LOPEZ  Order #:    [id number]                    Reading MD: Rich Guevara MD    Measurements  Intervals                                Axis  Rate:       123                          P:          67  AK:         225                          QRS:        17  QRSD:       240                          T:          30  QT:         312  QTc:        447    Interpretive Statements  Sinus tachycardia  Ventricular bigeminy  Left atrial enlargement  Lateral leads are also involved  Artifact  in lead(s) II,III,aVL,aVF,V1,V2,V3,V4,V5,V6  Compared to ECG 09/06/2024 18:29:54    Electronically Signed On 09- 13:39:30 PDT by Rich Guevara MD     TROPONIN    Collection Time: 09/07/24  1:03 PM   Result Value Ref Range    Troponin T 8 6 - 19 ng/L   CBC with Differential: Tomorrow AM    Collection Time: 09/08/24  6:48 AM   Result Value Ref Range    WBC 6.6 4.8 - 10.8 K/uL    RBC 4.30 4.20 - 5.40 M/uL    Hemoglobin 13.4 12.0 - 16.0 g/dL    Hematocrit 39.7 37.0 - 47.0 %    MCV 92.3 81.4 - 97.8 fL    MCH 31.2 27.0 - 33.0 pg    MCHC 33.8 32.2 - 35.5 g/dL    RDW 43.0 35.9 - 50.0 fL    Platelet Count 177 164 - 446 K/uL    MPV 9.2 9.0 - 12.9 fL    Neutrophils-Polys 51.80 44.00 - 72.00 %    Lymphocytes 34.30 22.00 - 41.00 %    Monocytes 8.00 0.00 - 13.40 %    Eosinophils 5.10 0.00 - 6.90 %    Basophils 0.50 0.00 - 1.80 %    Immature Granulocytes 0.30 0.00 - 0.90 %    Nucleated RBC 0.00 0.00 - 0.20 /100 WBC    Neutrophils (Absolute) 3.43 1.82 - 7.42 K/uL    Lymphs (Absolute) 2.27 1.00 - 4.80 K/uL    Monos (Absolute) 0.53 0.00 - 0.85 K/uL    Eos (Absolute) 0.34 0.00 - 0.51 K/uL    Baso (Absolute) 0.03 0.00 - 0.12 K/uL    Immature Granulocytes (abs) 0.02 0.00 - 0.11 K/uL    NRBC (Absolute) 0.00 K/uL   Basic Metabolic Panel    Collection Time: 09/08/24  6:48 AM   Result Value Ref Range    Sodium 136 135 - 145 mmol/L    Potassium 4.4 3.6 - 5.5 mmol/L    Chloride 104 96 - 112 mmol/L    Co2 21 20 - 33 mmol/L    Glucose 96 65 - 99 mg/dL    Bun 14 8 - 22 mg/dL    Creatinine 0.54 0.50 - 1.40 mg/dL    Calcium 9.1 8.5 - 10.5 mg/dL    Anion Gap 11.0 7.0 - 16.0   ESTIMATED GFR    Collection Time: 09/08/24  6:48 AM   Result Value Ref Range    GFR (CKD-EPI) 92 >60 mL/min/1.73 m 2       Fluids    Intake/Output Summary (Last 24 hours) at 9/8/2024 1015  Last data filed at 9/7/2024 1506  Gross per 24 hour   Intake 40 ml   Output --   Net 40 ml       Core Measures & Quality Metrics  Labs reviewed, Radiology images reviewed and  Medications reviewed  Leo catheter: No Leo      DVT Prophylaxis: Contraindicated - High bleeding risk  DVT prophylaxis - mechanical: SCDs  Ulcer prophylaxis: Not indicated    Assessed for rehab: Patient returned to prior level of function, rehabilitation not indicated at this time    RAP Score Total: 7    CAGE Results: not completed Blood Alcohol>0.08: no     Assessment/Plan  * Trauma- (present on admission)  Assessment & Plan  Mechanical fall.  Trauma Yellow Transfer Activation from [facility] in [place], [place].  Silas Cartagena MD. Trauma Surgery.    Arrhythmia- (present on admission)  Assessment & Plan  EKG on arrival with atrial fibrillation with rate 130s - 140s.  9/6 Cardiology consulted, no atrial fibrillation by interpretation on admit EKG. Repeat EKG with sinus rhythm. Recs changing atenolol to metoprolol.  Echocardiogram with LVEF 50%.  9/7 Possible 1 second degree heart block on cardiac monitoring.   9/8 Formal Cardiology consult pending.  Remote telemetry.    Traumatic intracranial hemorrhage without loss of consciousness (HCC)- (present on admission)  Assessment & Plan  Repeat head CT on arrival with stable left parafalcine subdural hematoma  Left frontal and parietal medial subarachnoid hemorrhages  Right medial subarachnoid hemorrhages, stable.  Non-operative management.  Post traumatic pharmacologic seizure prophylaxis for 1 week.  Speech Language Pathology cognitive evaluation.  Morro Lucas MD. Neurosurgeon. [facility].    Encounter for geriatric assessment- (present on admission)  Assessment & Plan  9/6 The patient is 75 years old or older and a geriatrics consult is indicated  Sean Cardozo MD, Geriatric Hospitalist.    Hypertension- (present on admission)  Assessment & Plan  Chronic condition treated with atenolol.  Held per cardiology recommendation. Initiated metoprolol.    Contraindication to deep vein thrombosis (DVT) prophylaxis- (present on  admission)  Assessment & Plan  VTE prophylaxis initially contraindicated secondary to elevated bleeding risk.  9/8 Trauma surveillance venous duplex ultrasonography ordered.    Aspirin long-term use- (present on admission)  Assessment & Plan  Takes aspirin 81 mg PRN for headaches.  TEG with platelet mapping with AA inhibition 90%.  Repeat head CT and neuro checks stable. GCS 15.   Platelet transfusion deferred.  Hold ASA for 6 weeks post injury per neurosurgery.    Chronic neck and back pain- (present on admission)  Assessment & Plan  History of herniated disks.      Discussed patient condition with RN, Charge nurse / hot rounds, Patient, and cardiology and trauma surgery, Dr. Byrd.

## 2024-09-08 NOTE — CONSULTS
Cardiology Initial Consultation    Date of Service  9/8/2024    Referring Physician  Silas Byrd M.D.    Reason for Consultation  Frequent ventricular ectopy, tachycardia    History of Presenting Illness  Joy Daniel is a 78 y.o. female with a past medical history of hypertension (atenolol 50 mg daily) who presented 9/6/2024 with a mechanical fall in her garage after she tripped that resulted in traumatic subarachnoid hemorrhage, intraparenchymal hemorrhage and interhemispheric subdural hematoma. She takes aspirin 3-4 times per week for headaches.     Trauma team originally reached out to cardiology services on 9/6/2024 regarding a concern of atrial fibrillation at another facility. No prior history of atrial fibrillation per chart review.  Patient denies history of atrial fibrillation. EKG taken at the ER at [facility] demonstrates sinus rhythm with a rate 119 with frequent PVCs.  No atrial fibrillation at [facility], per my review of telemetry. I personally reviewed the outside facility records that did not include an EKG or telemetry strips.  There were no notes about the presence of atrial fibrillation by the emergency department physician.  I contacted [facility] and received a record of an EKG by fax and my personal interpretation of the rhythm demonstrates sinus rhythm, rate 106, with frequent PVCs.  This EKG from an outside facility is scanned to media, dated 9/5/2024 at 21:09.     Cardiology services were reconsulted today due to one episode of Wenckebach block on 9/7/2024 at 5:25 PM and frequent PVCs and bigeminy despite the introduction of metoprolol treatment.      Currently, patient is in sinus tachycardia/sinus rhythm, rates  with frequent PVCs and bigeminy.  She is asymptomatic.  She denies palpitations, dizziness, lightheadedness, chest pain or pressure.    Electrolytes are within normal limits.  TSH is normal.  Echocardiogram shows LVEF 50% with no evidence of  valvular abnormality.        Review of Systems  Review of Systems   Constitutional:  Positive for fatigue.   Respiratory:  Negative for chest tightness, shortness of breath and wheezing.    Cardiovascular:  Negative for chest pain, palpitations and leg swelling.   Gastrointestinal:  Negative for abdominal distention and abdominal pain.   Musculoskeletal:  Positive for neck pain.   Neurological:  Positive for headaches. Negative for dizziness, facial asymmetry and light-headedness.   Psychiatric/Behavioral: Negative.         Past Medical History   has a past medical history of Hypertension.    Surgical History   has no past surgical history on file.    Family History  family history is not on file.    Social History   reports that she has never smoked. She has never used smokeless tobacco. She reports that she does not drink alcohol and does not use drugs.    Medications  Prior to Admission Medications   Prescriptions Last Dose Informant Patient Reported? Taking?   B Complex Vitamins (VITAMIN B COMPLEX PO) UNK at UNK Patient Yes No   Sig: Take 1 Tablet by mouth every day.   Calcium 600 MG Tab UNK at UNK Patient Yes Yes   Sig: Take 600 mg by mouth every day.   Omega-3 Fatty Acids (FISH OIL) 1000 MG Cap capsule UNK at UNK Patient Yes No   Sig: Take 1,000 mg by mouth 2 times a day.   acetaminophen (TYLENOL) 500 MG Tab FEW DAYS AGO at PRN Patient Yes Yes   Sig: Take 500-1,000 mg by mouth 1 time a day as needed for Mild Pain.   ascorbic acid (VITAMIN C) 500 MG tablet UNK at UNK Patient Yes No   Sig: Take 500 mg by mouth every day.   aspirin (ASA) 325 MG Tab 9/5/2024 at PM Patient Yes Yes   Sig: Take 325-650 mg by mouth 1 time a day as needed (Headache).   atenolol (TENORMIN) 50 MG Tab 9/4/2024 at PM Patient Yes No   Sig: Take 1 Tablet by mouth every day.   clonazePAM (KLONOPIN) 0.5 MG Tab 9/6/2024  Yes Yes   Sig: Take 0.5 mg by mouth 1 time a day as needed (anxiety).   magnesium oxide (MAG-OX) 400 MG Tab tablet UNK at UNK  Patient Yes Yes   Sig: Take 400 mg by mouth every day.   therapeutic multivitamin-minerals (THERAGRAN-M) Tab UNK at UNK Patient Yes Yes   Sig: Take 1 Tablet by mouth every day.      Facility-Administered Medications: None       Allergies  Allergies   Allergen Reactions    Codeine        Vital signs in last 24 hours  Temp:  [36 °C (96.8 °F)-36.4 °C (97.6 °F)] 36 °C (96.8 °F)  Pulse:  [43-80] 66  Resp:  [18-20] 18  BP: (109-141)/(62-85) 141/82  SpO2:  [89 %-96 %] 94 %    Physical Exam  Physical Exam  Constitutional:       Appearance: Normal appearance.   Cardiovascular:      Rate and Rhythm: Normal rate and regular rhythm. Frequent Extrasystoles are present.     Heart sounds: No murmur heard.  Pulmonary:      Effort: No respiratory distress.      Breath sounds: No rhonchi or rales.   Abdominal:      General: There is no distension.      Palpations: Abdomen is soft.   Musculoskeletal:      Right lower leg: No edema.      Left lower leg: No edema.   Skin:     General: Skin is warm and dry.   Neurological:      Mental Status: She is alert and oriented to person, place, and time.   Psychiatric:         Mood and Affect: Mood normal.         Behavior: Behavior normal.         Thought Content: Thought content normal.         Judgment: Judgment normal.         Lab Review  Lab Results   Component Value Date/Time    WBC 6.6 09/08/2024 06:48 AM    RBC 4.30 09/08/2024 06:48 AM    HEMOGLOBIN 13.4 09/08/2024 06:48 AM    HEMATOCRIT 39.7 09/08/2024 06:48 AM    MCV 92.3 09/08/2024 06:48 AM    MCH 31.2 09/08/2024 06:48 AM    MCHC 33.8 09/08/2024 06:48 AM    MPV 9.2 09/08/2024 06:48 AM      Lab Results   Component Value Date/Time    SODIUM 136 09/08/2024 06:48 AM    POTASSIUM 4.4 09/08/2024 06:48 AM    CHLORIDE 104 09/08/2024 06:48 AM    CO2 21 09/08/2024 06:48 AM    GLUCOSE 96 09/08/2024 06:48 AM    BUN 14 09/08/2024 06:48 AM    CREATININE 0.54 09/08/2024 06:48 AM      Lab Results   Component Value Date/Time    ASTSGOT 16 09/07/2024  "03:36 AM    ALTSGPT 36 09/07/2024 03:36 AM     Lab Results   Component Value Date/Time    TROPONINT 8 09/07/2024 01:03 PM       No results for input(s): \"NTPROBNP\" in the last 72 hours.    Cardiac Imaging and Procedures Review    Echocardiogram: 9/6/2024  No prior study is available for comparison.   Low normal left ventricular systolic function. The ejection fraction is   measured to be 50%.  LVEF is likely underestimated due to presence of arrythmias.  No evidence of valvular abnormality based on Doppler evaluation.   Estimated right ventricular systolic pressure is 23 mmHg.      Assessment/Plan  No new Assessment & Plan notes have been filed under this hospital service since the last note was generated.  Service: Cardiology    #.  Premature ventricular contractions, frequent with bigeminy  #.  Second-degree AV block, type I  #.  Traumatic intracranial hemorrhage  #.  Hypertension    Recommendations:  Patient has a moderate amount of arrhythmia per telemetry review, mostly consist of sinus rhythm/sinus tachycardia with very frequent PVCs and bigeminy.  She was noted to have 1 episode of second-degree A-V block, type I yesterday without recurrence.  No atrial fibrillation per telemetry review.  All this arrhythmias are asymptomatic.  Suspect this is provoked by her traumatic intracranial hemorrhage.    She maintains stable blood pressures.  Her echocardiogram demonstrates preserved LVEF 50%.  She was initiated on metoprolol 25 mg 3 times daily by the hospitalist team without much improvement in her ectopic beats.  While on metoprolol, she developed second-degree type I AV block, 1 episode per telemetry strip reviewed.  -Recommend stopping metoprolol  -Recommend against resuming home beta-blocker atenolol 50 mg daily that she was taking for hypertension.  If blood pressure remains elevated, she can be started on ACE/ARB as an antihypertensive agent.  -Blood pressure goals per trauma team.  -No need for additional " medications/antiarrhythmics  -Suspect an improvement in PVCs and bigeminy with her overall recovery.  -Suspect an improvement in Wenckebach block by discontinuing metoprolol.  -In regards to the arrhythmia, patient is okay to discharge from cardiology perspective   -Will arrange a follow-up with cardiology outpatient.            Cardiology will sign off on this patient      Discussed with JADYN Chappell.     Please contact me with any questions.  Thank you for allowing us to participate in the care of this patient.      Please see Dr. Carlson  attestation for further details and MDM.     I, JORGE Jacques performed a portion of the service face-to-face with the same patient on the same date of service independently of Dr. Carlson FOR  25  MINUTES. preparing to see the patient, reviewing hospital notes and tests, obtaining history from the patient, performing a medically appropriate exam, counseling and educating the patient, ordering medications/tests/procedures/referrals as clinically indicated, and documenting information in the electronic medical record.    Please note this dictation was created using voice recognition software.  I have made every reasonable attempt to correct obvious errors, but there may be errors of grammar and possibly content that I did not discover before finalizing the note.    JORGE Jacques  [facility]  [phone]

## 2024-09-08 NOTE — DISCHARGE PLANNING
note:   called [facility] and was informed that they cannot confirm acceptance until tomorrow. Delaney from [facility] said she does not have access to the referrals on a Sunday.

## 2024-09-08 NOTE — CARE PLAN
The patient is Watcher - Medium risk of patient condition declining or worsening    Shift Goals  Clinical Goals: safety,mobility  Patient Goals: rest,sleep  Family Goals: updates, comfort    Progress made toward(s) clinical / shift goals:  able to sleep,ambulated to bathroom  Problem: Knowledge Deficit - Standard  Goal: Patient and family/care givers will demonstrate understanding of plan of care, disease process/condition, diagnostic tests and medications  Outcome: Progressing     Problem: Fall Risk  Goal: Patient will remain free from falls  Outcome: Progressing     Problem: Pain - Standard  Goal: Alleviation of pain or a reduction in pain to the patient’s comfort goal  Outcome: Progressing       Patient is not progressing towards the following goals:
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
## 2024-09-10 LAB — COMPONENT CELLULAR 8504CLL: NORMAL

## 2024-09-12 ENCOUNTER — OFFICE VISIT (OUTPATIENT)
Dept: CARDIOLOGY | Facility: MEDICAL CENTER | Age: 79
End: 2024-09-12
Payer: MEDICARE

## 2024-09-12 VITALS
HEART RATE: 48 BPM | DIASTOLIC BLOOD PRESSURE: 82 MMHG | SYSTOLIC BLOOD PRESSURE: 138 MMHG | RESPIRATION RATE: 16 BRPM | OXYGEN SATURATION: 98 % | HEIGHT: 62 IN | WEIGHT: 148 LBS | BODY MASS INDEX: 27.23 KG/M2

## 2024-09-12 DIAGNOSIS — I49.1 PREMATURE ATRIAL COMPLEX: ICD-10-CM

## 2024-09-12 DIAGNOSIS — I49.3 PREMATURE VENTRICULAR COMPLEX: ICD-10-CM

## 2024-09-12 DIAGNOSIS — I49.40 CARDIAC ARRHYTHMIA DUE TO PREMATURE DEPOLARIZATION, UNSPECIFIED TYPE: ICD-10-CM

## 2024-09-12 DIAGNOSIS — I49.9 CARDIAC ARRHYTHMIA, UNSPECIFIED CARDIAC ARRHYTHMIA TYPE: ICD-10-CM

## 2024-09-12 DIAGNOSIS — I25.118 CORONARY ARTERY DISEASE INVOLVING NATIVE CORONARY ARTERY OF NATIVE HEART WITH OTHER FORM OF ANGINA PECTORIS (HCC): ICD-10-CM

## 2024-09-12 LAB
EKG IMPRESSION: NORMAL
EKG IMPRESSION: NORMAL

## 2024-09-12 PROCEDURE — 93005 ELECTROCARDIOGRAM TRACING: CPT | Performed by: INTERNAL MEDICINE

## 2024-09-12 PROCEDURE — 93010 ELECTROCARDIOGRAM REPORT: CPT | Performed by: INTERNAL MEDICINE

## 2024-09-12 PROCEDURE — 3075F SYST BP GE 130 - 139MM HG: CPT | Performed by: INTERNAL MEDICINE

## 2024-09-12 PROCEDURE — 99204 OFFICE O/P NEW MOD 45 MIN: CPT | Performed by: INTERNAL MEDICINE

## 2024-09-12 PROCEDURE — 3079F DIAST BP 80-89 MM HG: CPT | Performed by: INTERNAL MEDICINE

## 2024-09-12 PROCEDURE — G2211 COMPLEX E/M VISIT ADD ON: HCPCS | Performed by: INTERNAL MEDICINE

## 2024-09-12 PROCEDURE — 99212 OFFICE O/P EST SF 10 MIN: CPT | Performed by: INTERNAL MEDICINE

## 2024-09-12 RX ORDER — NEBIVOLOL 5 MG/1
5 TABLET ORAL DAILY
Qty: 90 TABLET | Refills: 4 | Status: SHIPPED | OUTPATIENT
Start: 2024-09-12

## 2024-09-12 ASSESSMENT — ENCOUNTER SYMPTOMS
FALLS: 0
MYALGIAS: 0
VOMITING: 0
HALLUCINATIONS: 0
NAUSEA: 0
PALPITATIONS: 1
FEVER: 0
SENSORY CHANGE: 0
COUGH: 0
DIZZINESS: 0
EYE DISCHARGE: 0
BLOOD IN STOOL: 0
WEIGHT LOSS: 0
ABDOMINAL PAIN: 0
DOUBLE VISION: 0
BRUISES/BLEEDS EASILY: 0
LOSS OF CONSCIOUSNESS: 0
DEPRESSION: 0
CHILLS: 0
SPEECH CHANGE: 0
ORTHOPNEA: 0
BLURRED VISION: 0
SHORTNESS OF BREATH: 0
EYE PAIN: 0
HEADACHES: 0
PND: 0
CLAUDICATION: 0

## 2024-09-12 ASSESSMENT — FIBROSIS 4 INDEX: FIB4 SCORE: 1.21

## 2024-09-12 NOTE — PROGRESS NOTES
Chief Complaint   Patient presents with    New Patient     Per referral:Cardiac arrhythmia due to premature depolarization, unspecified type       Subjective     Joy Daniel is a 78 y.o. female who presents today for cardiac care and evaluation of palpitations. Palpitations are sporadic. No specific worsening symptoms or precipitating symptoms. Patient feels like heart is being pulled down. No family history of sudden cardiac death. No presyncope or syncope associated with patient's palpitations.    Patient has chest pain at sporadic times. No specific precipitating factors or worsening factors. Chest pain is described to be pressure-like sensation however localized at anterior chest without radition. Lasting for seconds to minutes. No prior cardiac problems. No prior cardiac workup.    I personally interpreted all EKG tracings available in epic which shows sinus rhythm with frequent PACs and PVCs.    LVEF of 50 % with global hypokinesis. No significant valvular disease. I have independently interpreted and reviewed echocardiogram's actual images.     Joy Daniel does not have any history of heart attack arrhythmias in the past. she never had cardiac catheterization or ablations procedure in the past.    Recent stroke with bleeding.    Past Medical History:   Diagnosis Date    Hypertension      History reviewed. No pertinent surgical history.  History reviewed. No pertinent family history.  Social History     Socioeconomic History    Marital status: Single     Spouse name: Not on file    Number of children: Not on file    Years of education: Not on file    Highest education level: Not on file   Occupational History    Not on file   Tobacco Use    Smoking status: Never    Smokeless tobacco: Never   Vaping Use    Vaping status: Never Used   Substance and Sexual Activity    Alcohol use: Never    Drug use: Never    Sexual activity: Not on file   Other Topics Concern    Not on file   Social History  Narrative    Not on file     Social Determinants of Health     Financial Resource Strain: Not on file   Food Insecurity: Not on file   Transportation Needs: Not on file   Physical Activity: Not on file   Stress: Not on file   Social Connections: Not on file   Intimate Partner Violence: Not on file   Housing Stability: Not on file     Allergies   Allergen Reactions    Codeine      Outpatient Encounter Medications as of 9/12/2024   Medication Sig Dispense Refill    Magnesium 400 MG Cap Take 400 mg by mouth 2 times a day. 180 Capsule 4    nebivolol (BYSTOLIC) 5 MG Tab tablet Take 1 Tablet by mouth every day. 90 Tablet 4    levETIRAcetam (KEPPRA) 500 MG Tab Take 1 Tablet by mouth every 12 hours for 4 days. 8 Tablet 0    ascorbic acid (VITAMIN C) 500 MG tablet Take 500 mg by mouth every day.      B Complex Vitamins (VITAMIN B COMPLEX PO) Take 1 Tablet by mouth every day.      Omega-3 Fatty Acids (FISH OIL) 1000 MG Cap capsule Take 1,000 mg by mouth 2 times a day.      acetaminophen (TYLENOL) 500 MG Tab Take 500-1,000 mg by mouth 1 time a day as needed for Mild Pain.      Calcium 600 MG Tab Take 600 mg by mouth every day.      therapeutic multivitamin-minerals (THERAGRAN-M) Tab Take 1 Tablet by mouth every day.      [DISCONTINUED] Magnesium 400 MG Cap Take 400 mg by mouth every day. 180 Capsule 4    [DISCONTINUED] magnesium oxide (MAG-OX) 400 MG Tab tablet Take 400 mg by mouth every day.       No facility-administered encounter medications on file as of 9/12/2024.     Review of Systems   Constitutional:  Negative for chills, fever, malaise/fatigue and weight loss.   HENT:  Negative for ear discharge, ear pain, hearing loss and nosebleeds.    Eyes:  Negative for blurred vision, double vision, pain and discharge.   Respiratory:  Negative for cough and shortness of breath.    Cardiovascular:  Positive for palpitations. Negative for chest pain, orthopnea, claudication, leg swelling and PND.   Gastrointestinal:  Negative for  "abdominal pain, blood in stool, melena, nausea and vomiting.   Genitourinary:  Negative for dysuria and hematuria.   Musculoskeletal:  Negative for falls, joint pain and myalgias.   Skin:  Negative for itching and rash.   Neurological:  Negative for dizziness, sensory change, speech change, loss of consciousness and headaches.   Endo/Heme/Allergies:  Negative for environmental allergies. Does not bruise/bleed easily.   Psychiatric/Behavioral:  Negative for depression, hallucinations and suicidal ideas.               Objective     /82 (BP Location: Right arm, Patient Position: Sitting, BP Cuff Size: Adult)   Pulse (!) 48   Resp 16   Ht 1.575 m (5' 2\")   Wt 67.1 kg (148 lb)   SpO2 98%   BMI 27.07 kg/m²     Physical Exam  Vitals and nursing note reviewed.   Constitutional:       General: She is not in acute distress.     Appearance: She is not diaphoretic.   HENT:      Head: Normocephalic and atraumatic.      Right Ear: External ear normal.      Left Ear: External ear normal.      Nose: No congestion or rhinorrhea.   Eyes:      General:         Right eye: No discharge.         Left eye: No discharge.   Neck:      Thyroid: No thyromegaly.      Vascular: No JVD.   Cardiovascular:      Rate and Rhythm: Normal rate and regular rhythm.      Pulses: Normal pulses.   Pulmonary:      Effort: No respiratory distress.   Abdominal:      General: There is no distension.      Tenderness: There is no abdominal tenderness.   Musculoskeletal:         General: No swelling or tenderness.      Right lower leg: No edema.      Left lower leg: No edema.   Skin:     General: Skin is warm and dry.   Neurological:      Mental Status: She is alert and oriented to person, place, and time.      Cranial Nerves: No cranial nerve deficit.   Psychiatric:         Behavior: Behavior normal.                Assessment & Plan     1. Cardiac arrhythmia, unspecified cardiac arrhythmia type  EKG    [id number] PET W/CT ATTENUATION    " nebivolol (BYSTOLIC) 5 MG Tab tablet    Cardiac Event Monitor      2. Premature ventricular complex  [id number] PET W/CT ATTENUATION    Magnesium 400 MG Cap    nebivolol (BYSTOLIC) 5 MG Tab tablet    Cardiac Event Monitor    DISCONTINUED: Magnesium 400 MG Cap      3. Premature atrial complex  [id number] PET W/CT ATTENUATION    Magnesium 400 MG Cap    nebivolol (BYSTOLIC) 5 MG Tab tablet    Cardiac Event Monitor    DISCONTINUED: Magnesium 400 MG Cap      4. Coronary artery disease involving native coronary artery of native heart with other form of angina pectoris (HCC)  [id number] PET W/CT ATTENUATION    Cardiac Event Monitor      5. Cardiac arrhythmia due to premature depolarization, unspecified type [I49.40]  Cardiac Event Monitor          Medical Decision Making: Today's Assessment/Status/Plan:   At this time, to further risk stratify, I will order a myocardial PET scan stress test to assess for coronary ischemia.  Will start Magnesium 400 mg BID.  Will start Bystolic 5 mg daily.  I will also obtain home long-term event monitoring with zio patch.    No indication for blood thinner as I have not seen any evidence of AF or atrial flutter. In addition, she is nota good candidate for anticoagulation due to recent brain bleeds.      This visit encounter signifies the visit complexity inherent to evaluation and management associated with medical care services that serve as the continuing focal point for all needed health care services and/or with medical care services that are part of ongoing care related to this patient's single, serious condition, complex cardiac condition.    Chiki Douglas M.D.

## 2024-09-19 ENCOUNTER — NON-PROVIDER VISIT (OUTPATIENT)
Dept: CARDIOLOGY | Facility: MEDICAL CENTER | Age: 79
End: 2024-09-19
Attending: INTERNAL MEDICINE
Payer: MEDICARE

## 2024-09-19 DIAGNOSIS — I49.3 PREMATURE VENTRICULAR COMPLEX: ICD-10-CM

## 2024-09-19 DIAGNOSIS — I49.40 CARDIAC ARRHYTHMIA DUE TO PREMATURE DEPOLARIZATION, UNSPECIFIED TYPE: ICD-10-CM

## 2024-09-19 DIAGNOSIS — I49.9 CARDIAC ARRHYTHMIA, UNSPECIFIED CARDIAC ARRHYTHMIA TYPE: ICD-10-CM

## 2024-09-19 DIAGNOSIS — I25.118 CORONARY ARTERY DISEASE INVOLVING NATIVE CORONARY ARTERY OF NATIVE HEART WITH OTHER FORM OF ANGINA PECTORIS (HCC): ICD-10-CM

## 2024-09-19 DIAGNOSIS — I49.1 PREMATURE ATRIAL COMPLEX: ICD-10-CM

## 2024-09-24 DIAGNOSIS — R07.89 OTHER CHEST PAIN: Primary | ICD-10-CM

## 2024-09-27 LAB — EKG IMPRESSION: NORMAL

## 2024-10-11 ENCOUNTER — TELEPHONE (OUTPATIENT)
Dept: CARDIOLOGY | Facility: MEDICAL CENTER | Age: 79
End: 2024-10-11
Payer: COMMERCIAL

## 2024-10-16 ENCOUNTER — HOSPITAL ENCOUNTER (OUTPATIENT)
Dept: RADIOLOGY | Facility: MEDICAL CENTER | Age: 79
End: 2024-10-16
Attending: INTERNAL MEDICINE
Payer: MEDICARE

## 2024-10-16 DIAGNOSIS — I49.3 PREMATURE VENTRICULAR COMPLEX: ICD-10-CM

## 2024-10-16 DIAGNOSIS — I49.9 CARDIAC ARRHYTHMIA, UNSPECIFIED CARDIAC ARRHYTHMIA TYPE: ICD-10-CM

## 2024-10-16 DIAGNOSIS — I49.1 PREMATURE ATRIAL COMPLEX: ICD-10-CM

## 2024-10-16 DIAGNOSIS — I25.118 CORONARY ARTERY DISEASE INVOLVING NATIVE CORONARY ARTERY OF NATIVE HEART WITH OTHER FORM OF ANGINA PECTORIS (HCC): ICD-10-CM

## 2024-11-06 ENCOUNTER — HOSPITAL ENCOUNTER (OUTPATIENT)
Dept: RADIOLOGY | Facility: MEDICAL CENTER | Age: 79
End: 2024-11-06
Attending: INTERNAL MEDICINE
Payer: MEDICARE

## 2024-11-06 PROCEDURE — 78431 MYOCRD IMG PET RST&STRS CT: CPT

## 2024-11-06 PROCEDURE — 78431 MYOCRD IMG PET RST&STRS CT: CPT | Mod: 26 | Performed by: INTERNAL MEDICINE

## 2024-11-06 PROCEDURE — 93018 CV STRESS TEST I&R ONLY: CPT | Performed by: INTERNAL MEDICINE

## 2024-12-12 ENCOUNTER — TELEMEDICINE (OUTPATIENT)
Dept: CARDIOLOGY | Facility: MEDICAL CENTER | Age: 79
End: 2024-12-12
Attending: INTERNAL MEDICINE
Payer: MEDICARE

## 2024-12-12 DIAGNOSIS — I49.1 PREMATURE ATRIAL COMPLEX: ICD-10-CM

## 2024-12-12 DIAGNOSIS — I49.3 PREMATURE VENTRICULAR COMPLEX: ICD-10-CM

## 2024-12-12 DIAGNOSIS — I25.118 CORONARY ARTERY DISEASE INVOLVING NATIVE CORONARY ARTERY OF NATIVE HEART WITH OTHER FORM OF ANGINA PECTORIS (HCC): ICD-10-CM

## 2024-12-12 DIAGNOSIS — E78.00 PURE HYPERCHOLESTEROLEMIA: ICD-10-CM

## 2024-12-12 DIAGNOSIS — I44.1 MOBITZ (TYPE) I (WENCKEBACH'S) ATRIOVENTRICULAR BLOCK: ICD-10-CM

## 2024-12-12 DIAGNOSIS — I47.10 SVT (SUPRAVENTRICULAR TACHYCARDIA) (HCC): ICD-10-CM

## 2024-12-12 DIAGNOSIS — I49.9 CARDIAC ARRHYTHMIA, UNSPECIFIED CARDIAC ARRHYTHMIA TYPE: ICD-10-CM

## 2024-12-12 PROCEDURE — G2211 COMPLEX E/M VISIT ADD ON: HCPCS | Mod: 95 | Performed by: INTERNAL MEDICINE

## 2024-12-12 PROCEDURE — 99214 OFFICE O/P EST MOD 30 MIN: CPT | Mod: 95 | Performed by: INTERNAL MEDICINE

## 2024-12-12 RX ORDER — CLONAZEPAM 0.5 MG/1
TABLET ORAL
COMMUNITY
Start: 2024-11-05

## 2024-12-12 RX ORDER — NEBIVOLOL 5 MG/1
5 TABLET ORAL DAILY
Qty: 90 TABLET | Refills: 4 | Status: SHIPPED | OUTPATIENT
Start: 2024-12-12

## 2024-12-12 ASSESSMENT — ENCOUNTER SYMPTOMS
LOSS OF CONSCIOUSNESS: 0
MYALGIAS: 0
BLOOD IN STOOL: 0
ORTHOPNEA: 0
VOMITING: 0
NAUSEA: 0
DIZZINESS: 0
SENSORY CHANGE: 0
SHORTNESS OF BREATH: 0
HALLUCINATIONS: 0
CHILLS: 0
FALLS: 0
EYE DISCHARGE: 0
ABDOMINAL PAIN: 0
WEIGHT LOSS: 0
BRUISES/BLEEDS EASILY: 0
SPEECH CHANGE: 0
FEVER: 0
BLURRED VISION: 0
EYE PAIN: 0
DOUBLE VISION: 0
PALPITATIONS: 1
CLAUDICATION: 0
DEPRESSION: 0
PND: 0
HEADACHES: 0
COUGH: 0

## 2024-12-12 NOTE — PROGRESS NOTES
Chief Complaint   Patient presents with    Follow-Up     F/V DX: Cardiac arrhythmia, unspecified cardiac arrhythmia type     This evaluation was conducted via Teams using secure and encrypted videoconferencing technology. The patient was in their home in the [facility].    The patient's identity was confirmed and verbal consent was obtained for this virtual visit.      Subjective     Joy Daniel is an 79 y.o. female who presents today for cardiac care and evaluation of palpitations. Palpitations are sporadic. No specific worsening symptoms or precipitating symptoms. Patient feels like heart is being pulled down. No family history of sudden cardiac death. No presyncope or syncope associated with patient's palpitations.    Patient has chest pain at sporadic times. No specific precipitating factors or worsening factors. Chest pain is described to be pressure-like sensation however localized at anterior chest without radition. Lasting for seconds to minutes. No prior cardiac problems. No prior cardiac workup.    10/2024 I personally interpreted all EKG tracings available in epic which shows sinus rhythm with frequent PACs and PVCs (9.5%).    09/2024 LVEF of 50 % with global hypokinesis. No significant valvular disease. I have independently interpreted and reviewed echocardiogram's actual images.     11/2024 Patient had negative cardiac PET scan stress test, no evidence of coronary ischemia with normal LVEF. I personally interpreted the images of the study and reviewed with patient in clinic today.    Recent stroke with bleeding.    Past Medical History:   Diagnosis Date    Hypertension      History reviewed. No pertinent surgical history.  History reviewed. No pertinent family history.  Social History     Socioeconomic History    Marital status: Single     Spouse name: Not on file    Number of children: Not on file    Years of education: Not on file    Highest education level: Not on file   Occupational  History    Not on file   Tobacco Use    Smoking status: Never    Smokeless tobacco: Never   Vaping Use    Vaping status: Never Used   Substance and Sexual Activity    Alcohol use: Never    Drug use: Never    Sexual activity: Not on file   Other Topics Concern    Not on file   Social History Narrative    Not on file     Social Drivers of Health     Financial Resource Strain: Not on file   Food Insecurity: Not on file   Transportation Needs: Not on file   Physical Activity: Not on file   Stress: Not on file   Social Connections: Not on file   Intimate Partner Violence: Not on file   Housing Stability: Not on file     Allergies   Allergen Reactions    Codeine      Outpatient Encounter Medications as of 12/12/2024   Medication Sig Dispense Refill    clonazePAM (KLONOPIN) 0.5 MG Tab 1/2 to 1 tablet Orally Once a day for 90 days  As needed      nebivolol (BYSTOLIC) 5 MG Tab tablet Take 1 Tablet by mouth every day. 90 Tablet 4    ascorbic acid (VITAMIN C) 500 MG tablet Take 500 mg by mouth every day.      acetaminophen (TYLENOL) 500 MG Tab Take 500-1,000 mg by mouth 1 time a day as needed for Mild Pain.      [DISCONTINUED] Magnesium 400 MG Cap Take 400 mg by mouth 2 times a day. 180 Capsule 4    [DISCONTINUED] nebivolol (BYSTOLIC) 5 MG Tab tablet Take 1 Tablet by mouth every day. 90 Tablet 4    [DISCONTINUED] B Complex Vitamins (VITAMIN B COMPLEX PO) Take 1 Tablet by mouth every day.      [DISCONTINUED] Omega-3 Fatty Acids (FISH OIL) 1000 MG Cap capsule Take 1,000 mg by mouth 2 times a day.      [DISCONTINUED] Calcium 600 MG Tab Take 600 mg by mouth every day.      [DISCONTINUED] therapeutic multivitamin-minerals (THERAGRAN-M) Tab Take 1 Tablet by mouth every day.       No facility-administered encounter medications on file as of 12/12/2024.     Review of Systems   Constitutional:  Negative for chills, fever, malaise/fatigue and weight loss.   HENT:  Negative for ear discharge, ear pain, hearing loss and nosebleeds.     Eyes:  Negative for blurred vision, double vision, pain and discharge.   Respiratory:  Negative for cough and shortness of breath.    Cardiovascular:  Positive for palpitations. Negative for chest pain, orthopnea, claudication, leg swelling and PND.   Gastrointestinal:  Negative for abdominal pain, blood in stool, melena, nausea and vomiting.   Genitourinary:  Negative for dysuria and hematuria.   Musculoskeletal:  Negative for falls, joint pain and myalgias.   Skin:  Negative for itching and rash.   Neurological:  Negative for dizziness, sensory change, speech change, loss of consciousness and headaches.   Endo/Heme/Allergies:  Negative for environmental allergies. Does not bruise/bleed easily.   Psychiatric/Behavioral:  Negative for depression, hallucinations and suicidal ideas.               Objective     There were no vitals taken for this visit.    Physical Exam  Vitals and nursing note reviewed.   Constitutional:       General: She is not in acute distress.     Appearance: She is not diaphoretic.   HENT:      Head: Normocephalic and atraumatic.      Right Ear: External ear normal.      Left Ear: External ear normal.      Nose: No congestion or rhinorrhea.   Eyes:      General:         Right eye: No discharge.         Left eye: No discharge.   Neck:      Thyroid: No thyromegaly.      Vascular: No JVD.   Cardiovascular:      Rate and Rhythm: Normal rate and regular rhythm.      Pulses: Normal pulses.   Pulmonary:      Effort: No respiratory distress.   Abdominal:      General: There is no distension.      Tenderness: There is no abdominal tenderness.   Musculoskeletal:         General: No swelling or tenderness.      Right lower leg: No edema.      Left lower leg: No edema.   Skin:     General: Skin is warm and dry.   Neurological:      Mental Status: She is alert and oriented to person, place, and time.      Cranial Nerves: No cranial nerve deficit.   Psychiatric:         Behavior: Behavior normal.                 Assessment & Plan     1. Premature ventricular complex  nebivolol (BYSTOLIC) 5 MG Tab tablet      2. Premature atrial complex  nebivolol (BYSTOLIC) 5 MG Tab tablet      3. Cardiac arrhythmia, unspecified cardiac arrhythmia type  nebivolol (BYSTOLIC) 5 MG Tab tablet      4. Mobitz (type) I (Wenckebach's) atrioventricular block        5. SVT (supraventricular tachycardia) (HCC)        6. Pure hypercholesterolemia  Basic Metabolic Panel    LIPID PANEL            Medical Decision Making: Today's Assessment/Status/Plan:   At this time, to further risk stratify, I will order a myocardial PET scan stress test to assess for coronary ischemia.  Will stop Magnesium 400 mg BID due to stomach pain.  Will continue Bystolic 5 mg daily.    No indication for blood thinner as I have not seen any evidence of AF or atrial flutter. In addition, she is not a good candidate for anticoagulation due to recent brain bleeds.      This visit encounter signifies the visit complexity inherent to evaluation and management associated with medical care services that serve as the continuing focal point for all needed health care services and/or with medical care services that are part of ongoing care related to this patient's single, serious condition, complex cardiac condition.    Chiki Douglas M.D.